# Patient Record
Sex: FEMALE | Race: WHITE | Employment: OTHER | URBAN - METROPOLITAN AREA
[De-identification: names, ages, dates, MRNs, and addresses within clinical notes are randomized per-mention and may not be internally consistent; named-entity substitution may affect disease eponyms.]

---

## 2017-06-06 ENCOUNTER — THERAPY VISIT (OUTPATIENT)
Dept: PHYSICAL THERAPY | Facility: CLINIC | Age: 62
End: 2017-06-06
Payer: COMMERCIAL

## 2017-06-06 DIAGNOSIS — R26.9 GAIT ABNORMALITY: ICD-10-CM

## 2017-06-06 DIAGNOSIS — Z98.890 S/P FOOT SURGERY, RIGHT: ICD-10-CM

## 2017-06-06 DIAGNOSIS — M79.671 RIGHT FOOT PAIN: Primary | ICD-10-CM

## 2017-06-06 PROCEDURE — 97110 THERAPEUTIC EXERCISES: CPT | Mod: GP | Performed by: PHYSICAL THERAPIST

## 2017-06-06 PROCEDURE — 97140 MANUAL THERAPY 1/> REGIONS: CPT | Mod: GP | Performed by: PHYSICAL THERAPIST

## 2017-06-06 PROCEDURE — 97161 PT EVAL LOW COMPLEX 20 MIN: CPT | Mod: GP | Performed by: PHYSICAL THERAPIST

## 2017-06-06 NOTE — MR AVS SNAPSHOT
After Visit Summary   6/6/2017    Nydia Phipps    MRN: [id number]           Patient Information     Date Of Birth          1955        Visit Information        Provider Department      6/6/2017 2:40 PM Lokesh Walter PT [facility] [facility]        Today's Diagnoses     Right foot pain    -  1    S/P foot surgery, right        Gait abnormality           Follow-ups after your visit        Your next 10 appointments already scheduled     Jun 13, 2017  8:10 AM CDT   WILLIAM Extremity with Lokesh Walter PT   [facility] [place] [facility] ([facility])    [address]  [place] [address]   [phone]            Jun 20, 2017  5:20 PM CDT   WILLIAM Extremity with Lokesh Walter PT   [facility] [place] [facility] ([facility])    [address]  [place] [address]   [phone]            Jun 27, 2017  4:40 PM CDT   WILLIAM Extremity with Lokesh Walter PT   [facility] [place] [facility] ([facility])    [address]  [address]  [place] [address]   [phone]            Jul 11, 2017  4:40 PM CDT   WILLIAM Extremity with Lokesh Walter PT   [facility] [place] [facility] ([facility])    [address]  [place] [address]   [phone]            Jul 18, 2017  4:40 PM CDT   WILLIAM Extremity with Lokesh Walter PT   [facility] [place] [facility] ([facility])    [address]  [place] [address]   [phone]              Who to contact     If you have questions or need follow up information about today's clinic visit or your schedule please contact [facility] directly at [phone].  Normal or non-critical lab and imaging results will be communicated to you by MyChart, letter or phone within 4 business days after the clinic has received the results. If you do not hear from us  "within 7 days, please contact the clinic through [employer] or phone. If you have a critical or abnormal lab result, we will notify you by phone as soon as possible.  Submit refill requests through [employer] or call your pharmacy and they will forward the refill request to us. Please allow 3 business days for your refill to be completed.          Additional Information About Your Visit        [employer] Information     ConnectYard lets you send messages to your doctor, view your test results, renew your prescriptions, schedule appointments and more. To sign up, go to [email or web address] . Click on \"Log in\" on the left side of the screen, which will take you to the Welcome page. Then click on \"Sign up Now\" on the right side of the page.     You will be asked to enter the access code listed below, as well as some personal information. Please follow the directions to create your username and password.     Your access code is: [email or web address]  Expires: 2017  3:55 PM     Your access code will  in 90 days. If you need help or a new code, please call your [place] clinic or [phone].        Care EveryWhere ID     This is your Care EveryWhere ID. This could be used by other organizations to access your [facility] medical records  [id number]         Blood Pressure from Last 3 Encounters:   12/02/15 137/87   10/10/15 129/77    Weight from Last 3 Encounters:   12/02/15 77.1 kg (170 lb)   10/10/15 75.6 kg (166 lb 9.6 oz)              We Performed the Following     HC PT EVAL, LOW COMPLEXITY     WILLIAM INITIAL EVAL REPORT     MANUAL THER TECH,1+REGIONS,EA 15 MIN     THERAPEUTIC EXERCISES        Primary Care Provider    None Specified       No primary provider on file.        Thank you!     Thank you for choosing [facility]  for your care. Our goal is always to provide you with excellent care. Hearing back from our patients is one way we can continue to improve our services. Please take a few " minutes to complete the written survey that you may receive in the mail after your visit with us. Thank you!             Your Updated Medication List - Protect others around you: Learn how to safely use, store and throw away your medicines at www.disposemymeds.org.          This list is accurate as of: 6/6/17  3:55 PM.  Always use your most recent med list.                   Brand Name Dispense Instructions for use    AMBIEN PO      Take 10 mg by mouth At Bedtime       AMITRIPTYLINE HCL PO      Take 10 mg by mouth At Bedtime       ARMOUR THYROID PO      Take 0.5 g by mouth       ESTRADIOL PO      Take 1 mg by mouth       LISINOPRIL PO      Take 10 mg by mouth daily       magnesium 250 MG tablet      Take 1 tablet by mouth daily       OMEGA-3 FISH OIL PO      Take 2,000 mg by mouth daily       PROBIOTIC DAILY Caps          PROGESTERONE MICRONIZED PO      Take 200 mg by mouth daily       VITAMIN D3 PO      Take 5,000 Units by mouth daily

## 2017-06-06 NOTE — PROGRESS NOTES
[facility] Initial Evaluation    Subjective:    Patient is a 61 year old female presenting with rehab right ankle/foot hpi. The history is provided by the patient. No  was used.   Nydia Phipps is a 61 year old female with a right foot and right ankle condition.  Condition occurred with:  Repetition/overuse.  Condition occurred: for unknown reasons.  This is a new condition  Patient presents to PT today with right foot pain s/p right TMT fusion on 2/1/17. She reports she has been going to physical therapy 1x/week since surgery but was referred for further PT to work on joint mobility. Most recently was referred to PT on 6/6/17.    Patient reports pain:  Anterior and other (1st MTP).  Radiates to:  No radiation.  Pain is described as sharp and is intermittent and reported as 3/10 and 8/10.  Associated symptoms:  Loss of motion/stiffness. Pain is worse during the day.  Symptoms are exacerbated by descending stairs and walking and relieved by rest.  Since onset symptoms are gradually improving.    Previous treatment includes physical therapy.  There was moderate improvement following previous treatment.  General health as reported by patient is good.  Pertinent medical history includes:  High blood pressure.  Medical allergies: no.  Other surgeries include:  None reported.  Current medications:  Thyroid medication, hormone replacement therapy and high blood pressure medication.  Current occupation is   .  Patient is working in normal job without restrictions.  Primary job tasks include:  Prolonged sitting and other (computer work).    Barriers include:  None as reported by patient.    Red flags:  None as reported by patient.                        Objective:      Gait:    Gait Type:  Antalgic   Assistive Devices:  None  Deviations:  Ankle:  Push off decr RGeneral Deviations:  Stance time decr    Flexibility/Screens:       Lower Extremity:  Decreased  left lower extremity flexibility:Gastroc and Soleus    Decreased right lower extremity flexibility:  Gastroc and Soleus          Ankle/Foot Evaluation  ROM:    AROM:    Dorsiflexion:  Left:   5  Right:   2  Plantarflexion:  Left:  52    Right:  55  Inversion:  Left:  40     Right:  35  Eversion:  5     Right:  2        Strength:    Dorsiflexion:  Left: 5/5      Pain:-   Right: 5-/5    Pain:-  Plantarflexion: Left: 5/5    Pain:-   Right: 4-/5   Pain:+  Inversion:Left: 4+/5   Pain:-     Right: 5-/5   Pain:-  Eversion:Left: 5/5   Pain:-  Right: 5-/5   Pain:-                      PALPATION: Palpation of ankle: Tenderness to palpation at right anterior ankle, 1st metatarsal and 1st MTP.      MOBILITY TESTING:       Talocrural Right: hypomobile    Midtarsal Right: hypomobile  First Ray Right: hypomobile  FUNCTIONAL TESTS:           Proprioception:  Stork Balance Test: Left: 20 seconds  Right: 5-10 seconds                                                     General     ROS    Assessment/Plan:      Patient is a 61 year old female with right side ankle complaints.    Patient has the following significant findings with corresponding treatment plan.                Diagnosis 1:  Right foot pain s/p TMT fusion    Pain -  self management, education and home program  Decreased ROM/flexibility - manual therapy, therapeutic exercise and home program  Decreased joint mobility - manual therapy, therapeutic exercise and home program  Decreased strength - therapeutic exercise, therapeutic activities and home program  Impaired balance - neuro re-education, therapeutic activities and home program  Impaired gait - gait training  Impaired muscle performance - neuro re-education and home program  Decreased function - therapeutic activities and home program    Therapy Evaluation Codes:   1) History comprised of:   Personal factors that impact the plan of care:      Time since onset of symptoms.    Comorbidity factors that impact the plan of  care are:      None.     Medications impacting care: None.  2) Examination of Body Systems comprised of:   Body structures and functions that impact the plan of care:      Ankle.   Activity limitations that impact the plan of care are:      Stairs, Standing and Walking.  3) Clinical presentation characteristics are:   Stable/Uncomplicated.  4) Decision-Making    Low complexity using standardized patient assessment instrument and/or measureable assessment of functional outcome.  Cumulative Therapy Evaluation is: Low complexity.    Previous and current functional limitations:  (See Goal Flow Sheet for this information)    Short term and Long term goals: (See Goal Flow Sheet for this information)     Communication ability:  Patient appears to be able to clearly communicate and understand verbal and written communication and follow directions correctly.  Treatment Explanation - The following has been discussed with the patient:   RX ordered/plan of care  Anticipated outcomes  Possible risks and side effects  This patient would benefit from PT intervention to resume normal activities.   Rehab potential is good.    Frequency:  1 X week, once daily  Duration:  for 6 weeks  Discharge Plan:  Achieve all LTG.  Independent in home treatment program.  Reach maximal therapeutic benefit.    Please refer to the daily flowsheet for treatment today, total treatment time and time spent performing 1:1 timed codes.

## 2017-06-20 ENCOUNTER — THERAPY VISIT (OUTPATIENT)
Dept: PHYSICAL THERAPY | Facility: CLINIC | Age: 62
End: 2017-06-20
Payer: COMMERCIAL

## 2017-06-20 DIAGNOSIS — Z98.890 S/P FOOT SURGERY, RIGHT: ICD-10-CM

## 2017-06-20 DIAGNOSIS — R26.9 GAIT ABNORMALITY: ICD-10-CM

## 2017-06-20 DIAGNOSIS — M79.671 RIGHT FOOT PAIN: ICD-10-CM

## 2017-06-20 PROCEDURE — 97140 MANUAL THERAPY 1/> REGIONS: CPT | Mod: GP | Performed by: PHYSICAL THERAPIST

## 2017-06-20 PROCEDURE — 97110 THERAPEUTIC EXERCISES: CPT | Mod: GP | Performed by: PHYSICAL THERAPIST

## 2017-06-27 ENCOUNTER — THERAPY VISIT (OUTPATIENT)
Dept: PHYSICAL THERAPY | Facility: CLINIC | Age: 62
End: 2017-06-27
Payer: COMMERCIAL

## 2017-06-27 DIAGNOSIS — M79.671 RIGHT FOOT PAIN: ICD-10-CM

## 2017-06-27 DIAGNOSIS — R26.9 GAIT ABNORMALITY: ICD-10-CM

## 2017-06-27 DIAGNOSIS — M79.672 LEFT FOOT PAIN: Primary | ICD-10-CM

## 2017-06-27 DIAGNOSIS — Z98.890 S/P FOOT SURGERY, RIGHT: ICD-10-CM

## 2017-06-27 PROCEDURE — 97161 PT EVAL LOW COMPLEX 20 MIN: CPT | Mod: GP | Performed by: PHYSICAL THERAPIST

## 2017-06-27 PROCEDURE — 97140 MANUAL THERAPY 1/> REGIONS: CPT | Mod: GP | Performed by: PHYSICAL THERAPIST

## 2017-06-27 PROCEDURE — 97110 THERAPEUTIC EXERCISES: CPT | Mod: GP | Performed by: PHYSICAL THERAPIST

## 2017-06-27 NOTE — MR AVS SNAPSHOT
"              After Visit Summary   6/27/2017    Nydia Phipps    MRN: [id number]           Patient Information     Date Of Birth          1955        Visit Information        Provider Department      6/27/2017 4:40 PM Lokesh Walter PT [facility] [place] [facility]        Today's Diagnoses     Left foot pain    -  1    Right foot pain        S/P foot surgery, right        Gait abnormality           Follow-ups after your visit        Your next 10 appointments already scheduled     Jul 11, 2017  4:40 PM CDT   WILLIAM Extremity with Lokesh Walter PT   [facility] [place] [facility] ([facility])    [address]  [place] [address]   [phone]            Jul 18, 2017  4:40 PM CDT   WILLIAM Extremity with Lokesh Walter PT   [facility] [place] [facility] ([facility])    [address]  [place] [address] [phone]              Who to contact     If you have questions or need follow up information about today's clinic visit or your schedule please contact [facility] directly at [phone].  Normal or non-critical lab and imaging results will be communicated to you by [employer], letter or phone within 4 business days after the clinic has received the results. If you do not hear from us within 7 days, please contact the clinic through [employer] or phone. If you have a critical or abnormal lab result, we will notify you by phone as soon as possible.  Submit refill requests through [employer] or call your pharmacy and they will forward the refill request to us. Please allow 3 business days for your refill to be completed.          Additional Information About Your Visit        MyChart Information     [employer] lets you send messages to your doctor, view your test results, renew your prescriptions, schedule appointments and more. To sign up, go to [email or web address] . Click on \"Log in\" on the left " "side of the screen, which will take you to the Welcome page. Then click on \"Sign up Now\" on the right side of the page.     You will be asked to enter the access code listed below, as well as some personal information. Please follow the directions to create your username and password.     Your access code is: [email or web address]  Expires: 2017  3:55 PM     Your access code will  in 90 days. If you need help or a new code, please call your [place] clinic or [phone].        Care EveryWhere ID     This is your Care EveryWhere ID. This could be used by other organizations to access your [facility] medical records  [id number]         Blood Pressure from Last 3 Encounters:   12/02/15 137/87   10/10/15 129/77    Weight from Last 3 Encounters:   12/02/15 77.1 kg (170 lb)   10/10/15 75.6 kg (166 lb 9.6 oz)              We Performed the Following     HC PT EVAL, LOW COMPLEXITY     WILLIAM INITIAL EVAL REPORT     MANUAL THER TECH,1+REGIONS,EA 15 MIN     THERAPEUTIC EXERCISES        Primary Care Provider    None Specified       No primary provider on file.        Equal Access to Services     [facility]: Hadii anabell Hines, waaxda neeta, qaybta kaalmada mariella, silva valdivia . So [facility] [phone].    ATENCIÓN: Si habla español, tiene a garcía disposición servicios gratuitos de asistencia lingüística. Llame al [phone].    We comply with applicable federal civil rights laws and [place] laws. We do not discriminate on the basis of race, color, national origin, age, disability sex, sexual orientation or gender identity.            Thank you!     Thank you for choosing [facility]  for your care. Our goal is always to provide you with excellent care. Hearing back from our patients is one way we can continue to improve our services. Please take a few minutes to complete the written survey that you may receive in the mail after your visit with us. " Thank you!             Your Updated Medication List - Protect others around you: Learn how to safely use, store and throw away your medicines at www.disposemymeds.org.          This list is accurate as of: 6/27/17  6:04 PM.  Always use your most recent med list.                   Brand Name Dispense Instructions for use Diagnosis    AMBIEN PO      Take 10 mg by mouth At Bedtime        AMITRIPTYLINE HCL PO      Take 10 mg by mouth At Bedtime        ARMOUR THYROID PO      Take 0.5 g by mouth        ESTRADIOL PO      Take 1 mg by mouth        LISINOPRIL PO      Take 10 mg by mouth daily        magnesium 250 MG tablet      Take 1 tablet by mouth daily        OMEGA-3 FISH OIL PO      Take 2,000 mg by mouth daily        PROBIOTIC DAILY Caps           PROGESTERONE MICRONIZED PO      Take 200 mg by mouth daily        VITAMIN D3 PO      Take 5,000 Units by mouth daily

## 2017-06-27 NOTE — PROGRESS NOTES
[facility] Initial Evaluation    Subjective:    Patient is a 61 year old female presenting with rehab left ankle/foot hpi. The history is provided by the patient. No  was used.   Nydia Phipps is a 61 year old female with a left foot condition.  Condition occurred with:  Repetition/overuse.  Condition occurred: for unknown reasons.  This is a new condition  Patient reports onset of left foot pain over the past few months after having surgery on her right foot. She reports she has noticed the pain more as she began walking more after the right foot surgery. MD orders dated 6/6/17  .    Patient reports pain:  Anterior.  Radiates to:  No radiation.  Pain is described as aching and is intermittent and reported as 4/10.  Associated symptoms:  Loss of motion/stiffness. Pain is worse during the day.  Symptoms are exacerbated by standing and walking and relieved by rest.  Since onset symptoms are unchanged.        General health as reported by patient is good.  Pertinent medical history includes:  High blood pressure.  Medical allergies: no.  Other surgeries include:  Orthopedic surgery (right foot).  Current medications:  Thyroid medication, hormone replacement therapy and high blood pressure medication.  Current occupation is   .  Patient is working in normal job without restrictions.  Primary job tasks include:  Prolonged sitting and other (computer work).    Barriers include:  None as reported by patient.    Red flags:  None as reported by patient.                        Objective:      Gait:    Gait Type:  Antalgic   Assistive Devices:  None  Deviations:  Ankle:  Push off decr R    Flexibility/Screens:       Lower Extremity:  Decreased left lower extremity flexibility:Gastroc and Soleus            Ankle/Foot Evaluation  ROM:    AROM:    Dorsiflexion: Left:   5  Right:    Plantarflexion: Left:  55    Right:   Inversion: Left:  40     Right:   Eversion:  6     Right:         Strength:    Dorsiflexion:  Left: 5/5      Pain:-     Plantarflexion: Left: 5/5    Pain:-     Inversion:Left: 4+/5   Pain:-       Eversion:Left: 5/5   Pain:-                        PALPATION: normal      MOBILITY TESTING:       Talocrural Left: hypomobile        Midtarsal Left: normal      First Ray Left: normal                                                        General     ROS    Assessment/Plan:      Patient is a 61 year old female with left side ankle complaints.    Patient has the following significant findings with corresponding treatment plan.                Diagnosis 1:  Left foot/ankle pain    Pain -  self management, education and home program  Decreased ROM/flexibility - manual therapy, therapeutic exercise and home program  Decreased joint mobility - manual therapy, therapeutic exercise and home program  Decreased strength - therapeutic exercise, therapeutic activities and home program  Impaired muscle performance - neuro re-education and home program  Decreased function - therapeutic activities and home program    Therapy Evaluation Codes:   1) History comprised of:   Personal factors that impact the plan of care:      None.    Comorbidity factors that impact the plan of care are:      None.     Medications impacting care: None.  2) Examination of Body Systems comprised of:   Body structures and functions that impact the plan of care:      Ankle.   Activity limitations that impact the plan of care are:      Standing and Walking.  3) Clinical presentation characteristics are:   Stable/Uncomplicated.  4) Decision-Making    Low complexity using standardized patient assessment instrument and/or measureable assessment of functional outcome.  Cumulative Therapy Evaluation is: Low complexity.    Previous and current functional limitations:  (See Goal Flow Sheet for this information)    Short term and Long term goals: (See Goal Flow Sheet for this information)     Communication ability:   Patient appears to be able to clearly communicate and understand verbal and written communication and follow directions correctly.  Treatment Explanation - The following has been discussed with the patient:   RX ordered/plan of care  Anticipated outcomes  Possible risks and side effects  This patient would benefit from PT intervention to resume normal activities.   Rehab potential is good.    Frequency:  1 X week, once daily  Duration:  for 6 weeks  Discharge Plan:  Achieve all LTG.  Independent in home treatment program.  Reach maximal therapeutic benefit.    Please refer to the daily flowsheet for treatment today, total treatment time and time spent performing 1:1 timed codes.

## 2017-06-27 NOTE — LETTER
[facility] [facility]  [address]  [address]  [place] [address]  [phone]    2017    Re: Nydia Phipps   :   1955  MRN:  [id number]   REFERRING PHYSICIAN:   Ariel Scott    [place] [facility]    Date of Initial Evaluation:  2017  Visits:  Rxs Used: 3  Reason for Referral:     Right foot pain  S/P foot surgery, right  Gait abnormality  Left foot pain    [facility] Initial Evaluation    Subjective:  Patient is a 61 year old female presenting with rehab left ankle/foot hpi. The history is provided by the patient. No  was used. Nydia Phipps is a 61 year old female with a left foot condition. Condition occurred with:  Repetition/overuse.  Condition occurred: for unknown reasons.  This is a new condition  Patient reports onset of left foot pain over the past few months after having surgery on her right foot. She reports she has noticed the pain more as she began walking more after the right foot surgery. MD orders dated 17. Patient reports pain:  Anterior.  Radiates to:  No radiation.  Pain is described as aching and is intermittent and reported as 4/10.  Associated symptoms:  Loss of motion/stiffness. Pain is worse during the day. Symptoms are exacerbated by standing and walking and relieved by rest.  Since onset symptoms are unchanged. General health as reported by patient is good.  Pertinent medical history includes:  High blood pressure.  Medical allergies: no.  Other surgeries include:  Orthopedic surgery (right foot).  Current medications:  Thyroid medication, hormone replacement therapy and high blood pressure medication.  Current occupation is . Patient is working in normal job without restrictions.  Primary job tasks include:  Prolonged sitting and other (computer work). Barriers include:  None as reported by patient. Red flags:  None as  reported by patient.            Objective:  Gait:    Gait Type:  Antalgic   Assistive Devices:  None  Deviations:  Ankle:  Push off decr R  Flexibility/Screens:   Lower Extremity:  Decreased left lower extremity flexibility:Gastroc and Soleus  Ankle/Foot Evaluation  Re: Nydia Phipps   :   1955  Pg 2    ROM:    AROM:    Dorsiflexion: Left:   5  Right:    Plantarflexion: Left:  55    Right:   Inversion: Left:  40     Right:   Eversion: 6     Right:   Strength:    Dorsiflexion:  Left: 5/5      Pain:-     Plantarflexion: Left: 5/5    Pain:-     Inversion:Left: 4+/5   Pain:-       Eversion:Left: 5/5   Pain:-    PALPATION: normal  MOBILITY TESTING:   Talocrural Left: hypomobile      Midtarsal Left: normal      First Ray Left: normal           Assessment/Plan:    Patient is a 61 year old female with left side ankle complaints.    Patient has the following significant findings with corresponding treatment plan.                Diagnosis 1:  Left foot/ankle pain    Pain -  self management, education and home program  Decreased ROM/flexibility - manual therapy, therapeutic exercise and home program  Decreased joint mobility - manual therapy, therapeutic exercise and home program  Decreased strength - therapeutic exercise, therapeutic activities and home program  Impaired muscle performance - neuro re-education and home program  Decreased function - therapeutic activities and home program    Therapy Evaluation Codes:   1) History comprised of:   Personal factors that impact the plan of care:      None.    Comorbidity factors that impact the plan of care are:      None.     Medications impacting care: None.  2) Examination of Body Systems comprised of:   Body structures and functions that impact the plan of care:      Ankle.   Activity limitations that impact the plan of care are:      Standing and Walking.  3) Clinical presentation characteristics are:   Stable/Uncomplicated.  4) Decision-Making    Low complexity  using standardized patient assessment instrument and/or measureable assessment of functional outcome.  Cumulative Therapy Evaluation is: Low complexity.  Re: Nydia Phipps   :   1955  Pg 3    Previous and current functional limitations:  (See Goal Flow Sheet for this information)    Short term and Long term goals: (See Goal Flow Sheet for this information)     Communication ability:  Patient appears to be able to clearly communicate and understand verbal and written communication and follow directions correctly.  Treatment Explanation - The following has been discussed with the patient:   RX ordered/plan of care  Anticipated outcomes  Possible risks and side effects  This patient would benefit from PT intervention to resume normal activities.   Rehab potential is good.    Frequency:  1 X week, once daily  Duration:  for 6 weeks  Discharge Plan:  Achieve all LTG.  Independent in home treatment program.  Reach maximal therapeutic benefit.    Thank you for your referral.    INQUIRIES  Therapist: Lokesh Walter DPT  [facility]  [address]  [place] [address]  Phone: [phone]  Fax: [phone]

## 2017-07-11 ENCOUNTER — THERAPY VISIT (OUTPATIENT)
Dept: PHYSICAL THERAPY | Facility: CLINIC | Age: 62
End: 2017-07-11
Payer: COMMERCIAL

## 2017-07-11 DIAGNOSIS — M79.672 LEFT FOOT PAIN: ICD-10-CM

## 2017-07-11 DIAGNOSIS — M79.671 RIGHT FOOT PAIN: ICD-10-CM

## 2017-07-11 DIAGNOSIS — R26.9 GAIT ABNORMALITY: ICD-10-CM

## 2017-07-11 DIAGNOSIS — Z98.890 S/P FOOT SURGERY, RIGHT: ICD-10-CM

## 2017-07-11 PROCEDURE — 97110 THERAPEUTIC EXERCISES: CPT | Mod: GP | Performed by: PHYSICAL THERAPIST

## 2017-07-11 PROCEDURE — 97112 NEUROMUSCULAR REEDUCATION: CPT | Mod: GP | Performed by: PHYSICAL THERAPIST

## 2017-07-11 PROCEDURE — 97140 MANUAL THERAPY 1/> REGIONS: CPT | Mod: GP | Performed by: PHYSICAL THERAPIST

## 2017-07-14 ENCOUNTER — THERAPY VISIT (OUTPATIENT)
Dept: PHYSICAL THERAPY | Facility: CLINIC | Age: 62
End: 2017-07-14
Payer: COMMERCIAL

## 2017-07-14 DIAGNOSIS — M79.672 LEFT FOOT PAIN: ICD-10-CM

## 2017-07-14 DIAGNOSIS — M79.671 RIGHT FOOT PAIN: ICD-10-CM

## 2017-07-14 DIAGNOSIS — Z98.890 S/P FOOT SURGERY, RIGHT: ICD-10-CM

## 2017-07-14 DIAGNOSIS — R26.9 GAIT ABNORMALITY: ICD-10-CM

## 2017-07-14 PROCEDURE — 97110 THERAPEUTIC EXERCISES: CPT | Mod: GP | Performed by: PHYSICAL THERAPIST

## 2017-07-14 PROCEDURE — 97112 NEUROMUSCULAR REEDUCATION: CPT | Mod: GP | Performed by: PHYSICAL THERAPIST

## 2017-07-14 PROCEDURE — 97140 MANUAL THERAPY 1/> REGIONS: CPT | Mod: GP | Performed by: PHYSICAL THERAPIST

## 2017-07-18 ENCOUNTER — THERAPY VISIT (OUTPATIENT)
Dept: PHYSICAL THERAPY | Facility: CLINIC | Age: 62
End: 2017-07-18
Payer: COMMERCIAL

## 2017-07-18 DIAGNOSIS — R26.9 GAIT ABNORMALITY: ICD-10-CM

## 2017-07-18 DIAGNOSIS — M79.671 RIGHT FOOT PAIN: ICD-10-CM

## 2017-07-18 DIAGNOSIS — M79.672 LEFT FOOT PAIN: ICD-10-CM

## 2017-07-18 DIAGNOSIS — Z98.890 S/P FOOT SURGERY, RIGHT: ICD-10-CM

## 2017-07-18 PROCEDURE — 97112 NEUROMUSCULAR REEDUCATION: CPT | Mod: GP | Performed by: PHYSICAL THERAPIST

## 2017-07-18 PROCEDURE — 97140 MANUAL THERAPY 1/> REGIONS: CPT | Mod: GP | Performed by: PHYSICAL THERAPIST

## 2017-07-18 PROCEDURE — 97110 THERAPEUTIC EXERCISES: CPT | Mod: GP | Performed by: PHYSICAL THERAPIST

## 2017-07-20 ENCOUNTER — THERAPY VISIT (OUTPATIENT)
Dept: PHYSICAL THERAPY | Facility: CLINIC | Age: 62
End: 2017-07-20
Payer: COMMERCIAL

## 2017-07-20 DIAGNOSIS — M79.671 RIGHT FOOT PAIN: ICD-10-CM

## 2017-07-20 DIAGNOSIS — R26.9 GAIT ABNORMALITY: ICD-10-CM

## 2017-07-20 DIAGNOSIS — Z98.890 S/P FOOT SURGERY, RIGHT: ICD-10-CM

## 2017-07-20 DIAGNOSIS — M79.672 LEFT FOOT PAIN: ICD-10-CM

## 2017-07-20 PROCEDURE — 97110 THERAPEUTIC EXERCISES: CPT | Mod: GP | Performed by: PHYSICAL THERAPIST

## 2017-07-20 PROCEDURE — 97140 MANUAL THERAPY 1/> REGIONS: CPT | Mod: GP | Performed by: PHYSICAL THERAPIST

## 2017-07-25 ENCOUNTER — THERAPY VISIT (OUTPATIENT)
Dept: PHYSICAL THERAPY | Facility: CLINIC | Age: 62
End: 2017-07-25
Payer: COMMERCIAL

## 2017-07-25 DIAGNOSIS — Z98.890 S/P FOOT SURGERY, RIGHT: ICD-10-CM

## 2017-07-25 DIAGNOSIS — R26.9 GAIT ABNORMALITY: ICD-10-CM

## 2017-07-25 DIAGNOSIS — M79.671 RIGHT FOOT PAIN: ICD-10-CM

## 2017-07-25 DIAGNOSIS — M79.672 LEFT FOOT PAIN: ICD-10-CM

## 2017-07-25 PROCEDURE — 97140 MANUAL THERAPY 1/> REGIONS: CPT | Mod: GP | Performed by: PHYSICAL THERAPIST

## 2017-07-25 PROCEDURE — 97110 THERAPEUTIC EXERCISES: CPT | Mod: GP | Performed by: PHYSICAL THERAPIST

## 2017-08-25 ENCOUNTER — THERAPY VISIT (OUTPATIENT)
Dept: PHYSICAL THERAPY | Facility: CLINIC | Age: 62
End: 2017-08-25
Payer: COMMERCIAL

## 2017-08-25 DIAGNOSIS — R26.9 GAIT ABNORMALITY: ICD-10-CM

## 2017-08-25 DIAGNOSIS — M79.672 LEFT FOOT PAIN: ICD-10-CM

## 2017-08-25 DIAGNOSIS — M79.671 RIGHT FOOT PAIN: ICD-10-CM

## 2017-08-25 DIAGNOSIS — Z98.890 S/P FOOT SURGERY, RIGHT: ICD-10-CM

## 2017-08-25 PROCEDURE — 97110 THERAPEUTIC EXERCISES: CPT | Mod: GP | Performed by: PHYSICAL THERAPIST

## 2017-08-25 PROCEDURE — 97140 MANUAL THERAPY 1/> REGIONS: CPT | Mod: GP | Performed by: PHYSICAL THERAPIST

## 2017-08-25 NOTE — MR AVS SNAPSHOT
"              After Visit Summary   8/25/2017    Nydia Phipps    MRN: [id number]           Patient Information     Date Of Birth          1955        Visit Information        Provider Department      8/25/2017 4:50 PM Lokesh Walter PT [place] [facility] [place] [facility]        Today's Diagnoses     Left foot pain        Right foot pain        S/P foot surgery, right        Gait abnormality           Follow-ups after your visit        Your next 10 appointments already scheduled     Sep 08, 2017  4:50 PM CDT   [facility] Extremity with Lokesh Walter PT   [place] [facility] [place] [facility] ([facility] [place])    [address]  [address]  [place] [id number] [phone]              Who to contact     If you have questions or need follow up information about today's clinic visit or your schedule please contact [place] [facility] directly at [phone].  Normal or non-critical lab and imaging results will be communicated to you by eÃ‡ifthart, letter or phone within 4 business days after the clinic has received the results. If you do not hear from us within 7 days, please contact the clinic through eÃ‡ifthart or phone. If you have a critical or abnormal lab result, we will notify you by phone as soon as possible.  Submit refill requests through [employer] or call your pharmacy and they will forward the refill request to us. Please allow 3 business days for your refill to be completed.          Additional Information About Your Visit        MyChart Information     [employer] lets you send messages to your doctor, view your test results, renew your prescriptions, schedule appointments and more. To sign up, go to [email or web address] . Click on \"Log in\" on the left side of the screen, which will take you to the Welcome page. Then click on \"Sign up Now\" on the right side of the page.     You will be asked to enter the access code listed below, as well as some personal information. " Please follow the directions to create your username and password.     Your access code is: [email or web address]  Expires: 2017  3:55 PM     Your access code will  in 90 days. If you need help or a new code, please call your [place] clinic or [phone].        Care EveryWhere ID     This is your Care EveryWhere ID. This could be used by other organizations to access your [place] medical records  [id number]         Blood Pressure from Last 3 Encounters:   12/02/15 137/87   10/10/15 129/77    Weight from Last 3 Encounters:   12/02/15 77.1 kg (170 lb)   10/10/15 75.6 kg (166 lb 9.6 oz)              We Performed the Following     [facility] PROGRESS NOTES REPORT     MANUAL THER TECH,1+REGIONS,EA 15 MIN     THERAPEUTIC EXERCISES        Primary Care Provider    None Specified       No primary provider on file.        Equal Access to Services     [facility]: Had[facility] anabell simms hadaravindo Solinda, waaxda luqadaha, qaybta kaalmada adejackyada, silva valdivia . So [place] -[phone].    ATENCIÓN: Si habla español, tiene a garcía disposición servicios gratuitos de asistencia lingüística. Maryan al [phone].    We comply with applicable federal civil rights laws and [place] laws. We do not discriminate on the basis of race, color, national origin, age, disability sex, sexual orientation or gender identity.            Thank you!     Thank you for choosing [facility]  for your care. Our goal is always to provide you with excellent care. Hearing back from our patients is one way we can continue to improve our services. Please take a few minutes to complete the written survey that you may receive in the mail after your visit with us. Thank you!             Your Updated Medication List - Protect others around you: Learn how to safely use, store and throw away your medicines at www.disposemymeds.org.          This list is accurate as of: 17 11:59 PM.  Always use your most recent  med list.                   Brand Name Dispense Instructions for use Diagnosis    AMBIEN PO      Take 10 mg by mouth At Bedtime        AMITRIPTYLINE HCL PO      Take 10 mg by mouth At Bedtime        ARMOUR THYROID PO      Take 0.5 g by mouth        ESTRADIOL PO      Take 1 mg by mouth        LISINOPRIL PO      Take 10 mg by mouth daily        magnesium 250 MG tablet      Take 1 tablet by mouth daily        OMEGA-3 FISH OIL PO      Take 2,000 mg by mouth daily        PROBIOTIC DAILY Caps           PROGESTERONE MICRONIZED PO      Take 200 mg by mouth daily        VITAMIN D3 PO      Take 5,000 Units by mouth daily

## 2017-08-25 NOTE — LETTER
[place] [facility]  [address]  [address]  [place] [address]  [phone]    2017    Re: Nydia Phipps   :   1955  MRN:  [id number]   REFERRING PHYSICIAN:   Ariel Scott    [place] [facility]    Date of Initial Evaluation: 17  Visits:  Rxs Used: 9  Reason for Referral:     Left foot pain  Right foot pain  S/P foot surgery, right  Gait abnormality    PROGRESS  REPORT    Progress reporting period is from 17 to 17.       SUBJECTIVE  Subjective changes noted by patient: Patient returns to therapy after 1 month away. She reports that the right foot remains sore, especially with ambulation. She reports she has been doing the calf stretches 1x/day. She reports that she has been able to do double leg calf raises, but unable to do the single leg due to pain. Patient reports that her left ankle/calf continue to remain tight even with stretching Current Pain level: 4/10.     Initial Pain level: 3/10. Changes in function:  Yes (See Goal flowsheet attached for changes in current functional level) Adverse reaction to treatment or activity: None    OBJECTIVE  Changes noted in objective findings:  AROM right ankle DF 10 degrees, increased to 12 after stretching and mobilization. Left ankle DF 8 degrees, increased to 10 degrees. Patient able to ambulate without gait deviation in clinic today. Patient demonstrates decreased talocrural posterior mobility     ASSESSMENT/PLAN  Updated problem list and treatment plan: Diagnosis 1:  Right foot pain s/p TMT fusion      Pain -  self management, education, directional preference exercise and home program  Decreased ROM/flexibility - manual therapy, therapeutic exercise and home program  Decreased joint mobility - manual therapy, therapeutic exercise and home program  Decreased strength - therapeutic exercise, therapeutic activities and home program  Decreased function - therapeutic  activities and home program  Diagnosis 2:  Left ankle/foot pain     Pain -  self management, education, directional preference exercise and home program  Decreased ROM/flexibility - manual therapy, therapeutic exercise and home program  Decreased joint mobility - manual therapy, therapeutic exercise and home program  Decreased strength - therapeutic exercise, therapeutic activities and home program    Nydia Phipps   :   1955                Decreased function - therapeutic activities and home program  STG/LTGs have been met or progress has been made towards goals:  Yes (See Goal flow sheet completed today.)  Assessment of Progress: The patient's condition is improving.  Patient is meeting short term goals and is progressing towards long term goals.  Self Management Plans:  Patient has been instructed in a home treatment program.  Patient is independent in a home treatment program.  Patient  has been instructed in self management of symptoms.  Patient is independent in self management of symptoms.  I have re-evaluated this patient and find that the nature, scope, duration and intensity of the therapy is appropriate for the medical condition of the patient.  Nydia continues to require the following intervention to meet STG and LTG's:  PT    Recommendations:  This patient would benefit from continued therapy.     Frequency:  1 X week, once daily  Duration:  for 4 weeks              Thank you for your referral.    INQUIRIES  Therapist: Lokesh Walter DPT  [facility]  [address]  [place] [address]  Phone: [phone]  Fax: [phone]

## 2017-08-27 NOTE — PROGRESS NOTES
Subjective:    HPI                    Objective:    System    Physical Exam    General     ROS    Assessment/Plan:      PROGRESS  REPORT    Progress reporting period is from 6/27/17 to 8/27/17.       SUBJECTIVE  Subjective changes noted by patient: Patient returns to therapy after 1 month away. She reports that the right foot remains sore, especially with ambulation. She reports she has been doing the calf stretches 1x/day. She reports that she has been able to do double leg calf raises, but unable to do the single leg due to pain. Patient reports that her left ankle/calf continue to remain tight even with stretching    Current Pain level: 4/10.      Initial Pain level: 3/10.   Changes in function:  Yes (See Goal flowsheet attached for changes in current functional level)  Adverse reaction to treatment or activity: None    OBJECTIVE  Changes noted in objective findings:  AROM right ankle DF 10 degrees, increased to 12 after stretching and mobilization. Left ankle DF 8 degrees, increased to 10 degrees. Patient able to ambulate without gait deviation in clinic today. Patient demonstrates decreased talocrural posterior mobility     ASSESSMENT/PLAN  Updated problem list and treatment plan: Diagnosis 1:  Right foot pain s/p TMT fusion      Pain -  self management, education, directional preference exercise and home program  Decreased ROM/flexibility - manual therapy, therapeutic exercise and home program  Decreased joint mobility - manual therapy, therapeutic exercise and home program  Decreased strength - therapeutic exercise, therapeutic activities and home program  Decreased function - therapeutic activities and home program  Diagnosis 2:  Left ankle/foot pain     Pain -  self management, education, directional preference exercise and home program  Decreased ROM/flexibility - manual therapy, therapeutic exercise and home program  Decreased joint mobility - manual therapy, therapeutic exercise and home  program  Decreased strength - therapeutic exercise, therapeutic activities and home program  Decreased function - therapeutic activities and home program  STG/LTGs have been met or progress has been made towards goals:  Yes (See Goal flow sheet completed today.)  Assessment of Progress: The patient's condition is improving.  Patient is meeting short term goals and is progressing towards long term goals.  Self Management Plans:  Patient has been instructed in a home treatment program.  Patient is independent in a home treatment program.  Patient  has been instructed in self management of symptoms.  Patient is independent in self management of symptoms.  I have re-evaluated this patient and find that the nature, scope, duration and intensity of the therapy is appropriate for the medical condition of the patient.  Nydia continues to require the following intervention to meet STG and LTG's:  PT    Recommendations:  This patient would benefit from continued therapy.     Frequency:  1 X week, once daily  Duration:  for 4 weeks          Please refer to the daily flowsheet for treatment today, total treatment time and time spent performing 1:1 timed codes.

## 2017-09-29 ENCOUNTER — THERAPY VISIT (OUTPATIENT)
Dept: PHYSICAL THERAPY | Facility: CLINIC | Age: 62
End: 2017-09-29
Payer: COMMERCIAL

## 2017-09-29 DIAGNOSIS — Z47.89 AFTERCARE FOLLOWING SURGERY OF THE MUSCULOSKELETAL SYSTEM: ICD-10-CM

## 2017-09-29 DIAGNOSIS — M25.572 ACUTE LEFT ANKLE PAIN: Primary | ICD-10-CM

## 2017-09-29 DIAGNOSIS — R26.9 GAIT ABNORMALITY: ICD-10-CM

## 2017-09-29 PROBLEM — M79.671 RIGHT FOOT PAIN: Status: RESOLVED | Noted: 2017-06-06 | Resolved: 2017-09-29

## 2017-09-29 PROBLEM — M79.672 LEFT FOOT PAIN: Status: RESOLVED | Noted: 2017-06-27 | Resolved: 2017-09-29

## 2017-09-29 PROBLEM — Z98.890 S/P FOOT SURGERY, RIGHT: Status: RESOLVED | Noted: 2017-06-06 | Resolved: 2017-09-29

## 2017-09-29 PROCEDURE — 97161 PT EVAL LOW COMPLEX 20 MIN: CPT | Mod: GP | Performed by: PHYSICAL THERAPIST

## 2017-09-29 PROCEDURE — 97110 THERAPEUTIC EXERCISES: CPT | Mod: GP | Performed by: PHYSICAL THERAPIST

## 2017-09-29 NOTE — PROGRESS NOTES
[facility] [facility] Initial Evaluation    Subjective:    Patient is a 62 year old female presenting with rehab left ankle/foot hpi. The history is provided by the patient. No  was used.   Nydia Phipps is a 62 year old female with a left ankle condition.  Condition occurred with:  Insidious onset.  Condition occurred: for unknown reasons.  This is a new condition  Patient is s/p left ankle gastroc recession and ankle ligament reconstruction internal brace on 9/11/17.    Patient reports pain:  Anterior and other (dorsal aspect of the foot).  Radiates to:  No radiation.  Pain is described as aching and is constant and reported as 5/10.  Associated symptoms:  Loss of motion/stiffness, edema and loss of strength. Pain is worse in the P.M..  Symptoms are exacerbated by walking, weight bearing and standing and relieved by rest.  Since onset symptoms are gradually improving.        General health as reported by patient is good.  Pertinent medical history includes:  High blood pressure.  Medical allergies: no.  Other surgeries include:  Orthopedic surgery (right foot reconstruction, left ankle).  Current medications:  Thyroid medication, hormone replacement therapy and high blood pressure medication.  Current occupation is   .  Patient is working in normal job without restrictions.  Primary job tasks include:  Prolonged sitting and other (computer work).    Barriers include:  None as reported by patient.    Red flags:  None as reported by patient.                        Objective:      Gait:    Gait Type:  Antalgic   Weight Bearing Status:  WBAT   Assistive Devices:  Brace  Deviations:  Ankle:  Push off decr L and push off decr RGeneral Deviations:  Stance time decr          Ankle/Foot Evaluation  ROM:    AROM:    Dorsiflexion: Left:   0  Right:    Plantarflexion: Left:  35    Right:   Inversion: Left:  15     Right:   Eversion: 5     Right:         Strength wnl ankle: Strength  testing deferred on left ankle.      PALPATION: normal    EDEMA: normal                                                              General     ROS    Assessment/Plan:      Patient is a 62 year old female with left side ankle complaints.    Patient has the following significant findings with corresponding treatment plan.                Diagnosis 1:  S/p left ankle    Pain -  self management, education and home program  Decreased ROM/flexibility - manual therapy, therapeutic exercise and home program  Decreased strength - therapeutic exercise, therapeutic activities and home program  Impaired gait - gait training  Impaired muscle performance - neuro re-education and home program  Decreased function - therapeutic activities and home program    Therapy Evaluation Codes:   1) History comprised of:   Personal factors that impact the plan of care:      None.    Comorbidity factors that impact the plan of care are:      High blood pressure.     Medications impacting care: High blood pressure.  2) Examination of Body Systems comprised of:   Body structures and functions that impact the plan of care:      Ankle.   Activity limitations that impact the plan of care are:      Standing and Walking.  3) Clinical presentation characteristics are:   Stable/Uncomplicated.  4) Decision-Making    Low complexity using standardized patient assessment instrument and/or measureable assessment of functional outcome.  Cumulative Therapy Evaluation is: Low complexity.    Previous and current functional limitations:  (See Goal Flow Sheet for this information)    Short term and Long term goals: (See Goal Flow Sheet for this information)     Communication ability:  Patient appears to be able to clearly communicate and understand verbal and written communication and follow directions correctly.  Treatment Explanation - The following has been discussed with the patient:   RX ordered/plan of care  Anticipated outcomes  Possible risks and side  effects  This patient would benefit from PT intervention to resume normal activities.   Rehab potential is good.    Frequency:  2 X week, once daily  Duration:  for 4 weeks tapering to 1 X a week over 4 weeks  Discharge Plan:  Achieve all LTG.  Independent in home treatment program.  Reach maximal therapeutic benefit.    Please refer to the daily flowsheet for treatment today, total treatment time and time spent performing 1:1 timed codes.

## 2017-09-29 NOTE — LETTER
[facility]  [address]  [address]  [place] [address]  [phone]    2017    Re: Nydia Phipps   :   1955  MRN:  [id number]   REFERRING PHYSICIAN:   Ariel Scott    [facility]    Date of Initial Evaluation: 17  Visits:  Rxs Used: 1  Reason for Referral:     Acute left ankle pain  Aftercare following surgery of the musculoskeletal system  Gait abnormality    EVALUATION SUMMARY    [facility] Initial Evaluation    Subjective:     Patient is a 62 year old female presenting with rehab left ankle/foot hpi. The history is provided by the patient. No  was used. Nydia Phipps is a 62 year old female with a left ankle condition.  Condition occurred with:  Insidious onset.  Condition occurred: for unknown reasons.  This is a new condition  Patient is s/p left ankle gastroc recession and ankle ligament reconstruction internal brace on 17. Patient reports pain:  Anterior and other (dorsal aspect of the foot).  Radiates to:  No radiation.  Pain is described as aching and is constant and reported as 5/10.  Associated symptoms:  Loss of motion/stiffness, edema and loss of strength. Pain is worse in the P.M..  Symptoms are exacerbated by walking, weight bearing and standing and relieved by rest.  Since onset symptoms are gradually improving.        General health as reported by patient is good.  Pertinent medical history includes:  High blood pressure.  Medical allergies: no.  Other surgeries include:  Orthopedic surgery (right foot reconstruction, left ankle).  Current medications:  Thyroid medication, hormone replacement therapy and high blood pressure medication.  Current occupation is  Patient is working in normal job without restrictions.  Primary job tasks include:  Prolonged sitting and other (computer work). Barriers include:  None as reported by  patient. Red flags:  None as reported by patient.    Objective:      Gait:    Gait Type:  Antalgic   Weight Bearing Status:  WBAT   Assistive Devices:  Brace  Deviations:  Ankle:  Push off decr L and push off decr RGeneral Deviations:  Stance time decr  Ankle/Foot Evaluation  ROM:    Nydia Phipps   :   1955          AROM:    Dorsiflexion: Left:   0  Right:    Plantarflexion: Left:  35    Right:   Inversion: Left:  15     Right:   Eversion: 5     Right:   Strength wnl ankle: Strength testing deferred on left ankle.  PALPATION: normal  EDEMA: normal    Assessment/Plan:      Patient is a 62 year old female with left side ankle complaints.    Patient has the following significant findings with corresponding treatment plan.                Diagnosis 1:  S/p left ankle    Pain -  self management, education and home program  Decreased ROM/flexibility - manual therapy, therapeutic exercise and home program  Decreased strength - therapeutic exercise, therapeutic activities and home program  Impaired gait - gait training  Impaired muscle performance - neuro re-education and home program  Decreased function - therapeutic activities and home program  Previous and current functional limitations:  (See Goal Flow Sheet for this information)    Short term and Long term goals: (See Goal Flow Sheet for this information)   Communication ability:  Patient appears to be able to clearly communicate and understand verbal and written communication and follow directions correctly.  Treatment Explanation - The following has been discussed with the patient:   RX ordered/plan of care  Anticipated outcomes  Possible risks and side effects  This patient would benefit from PT intervention to resume normal activities.   Rehab potential is good.  Frequency:  2 X week, once daily  Duration:  for 4 weeks tapering to 1 X a week over 4 weeks  Discharge Plan:  Achieve all LTG.  Independent in home treatment program.  Reach maximal therapeutic  benefit.           Thank you for your referral.    INQUIRIES  Therapist: Lokesh Walter DPT  [facility]  [address]  [address]  [place] [address]  Phone: [phone]  Fax: [phone]

## 2017-09-29 NOTE — MR AVS SNAPSHOT
After Visit Summary   9/29/2017    Nydia Phipps    MRN: [id number]           Patient Information     Date Of Birth          1955        Visit Information        Provider Department      9/29/2017 8:20 AM Lokesh Walter PT [facility] [place] [facility]        Today's Diagnoses     Acute left ankle pain    -  1    Aftercare following surgery of the musculoskeletal system        Gait abnormality           Follow-ups after your visit        Your next 10 appointments already scheduled     Oct 06, 2017  8:20 AM CDT   [facility]ILL[facility] Extremity with Lokesh Walter PT   [facility] [place] [facility] ([facility] [place])    [address]  [place] [address]   [phone]            Oct 10, 2017  7:20 AM CDT   WILLIAM Extremity with Lokesh Walter PT   [facility] [place] [facility] ([facility] [place])    [address]  [place] [address]   [phone]            Oct 12, 2017  5:05 PM CDT   WILLIAM Extremity with Lokesh Walter PT   [facility] ([facility]  )    [address] [id number] [phone]              Who to contact     If you have questions or need follow up information about today's clinic visit or your schedule please contact [facility] directly at [phone].  Normal or non-critical lab and imaging results will be communicated to you by MyChart, letter or phone within 4 business days after the clinic has received the results. If you do not hear from us within 7 days, please contact the clinic through MyChart or phone. If you have a critical or abnormal lab result, we will notify you by phone as soon as possible.  Submit refill requests through [employer] or call your pharmacy and they will forward the refill request to us. Please allow 3 business days for your refill to be completed.          Additional Information About Your Visit        MyChart Information   "   [employer] lets you send messages to your doctor, view your test results, renew your prescriptions, schedule appointments and more. To sign up, go to [email or web address] . Click on \"Log in\" on the left side of the screen, which will take you to the Welcome page. Then click on \"Sign up Now\" on the right side of the page.     You will be asked to enter the access code listed below, as well as some personal information. Please follow the directions to create your username and password.     Your access code is: [email or web address]  Expires: 2017  3:21 PM     Your access code will  in 90 days. If you need help or a new code, please call your [facility] clinic or [phone].        Care EveryWhere ID     This is your Care EveryWhere ID. This could be used by other organizations to access your [facility] medical records  [id number]         Blood Pressure from Last 3 Encounters:   12/02/15 137/87   10/10/15 129/77    Weight from Last 3 Encounters:   12/02/15 77.1 kg (170 lb)   10/10/15 75.6 kg (166 lb 9.6 oz)              We Performed the Following     HC PT EVAL, LOW COMPLEXITY     [facility] INITIAL EVAL REPORT     THERAPEUTIC EXERCISES        Primary Care Provider    None Specified       No primary provider on file.        Equal Access to Services     TO [place] : Hadii anabell perdomoo Solinda, waaxda luqadaha, qaybta kaalmada adeegyada, silva valdivia . So [facility] [phone].    ATENCIÓN: Si habla español, tiene a garcía disposición servicios gratuitos de asistencia lingüística. Llame al [phone].    We comply with applicable federal civil rights laws and [place] laws. We do not discriminate on the basis of race, color, national origin, age, disability, sex, sexual orientation, or gender identity.            Thank you!     Thank you for choosing [facility]  for your care. Our goal is always to provide you with excellent care. Hearing back from our patients is " one way we can continue to improve our services. Please take a few minutes to complete the written survey that you may receive in the mail after your visit with us. Thank you!             Your Updated Medication List - Protect others around you: Learn how to safely use, store and throw away your medicines at www.disposemymeds.org.          This list is accurate as of: 9/29/17  3:21 PM.  Always use your most recent med list.                   Brand Name Dispense Instructions for use Diagnosis    AMBIEN PO      Take 10 mg by mouth At Bedtime        AMITRIPTYLINE HCL PO      Take 10 mg by mouth At Bedtime        ARMOUR THYROID PO      Take 0.5 g by mouth        ESTRADIOL PO      Take 1 mg by mouth        LISINOPRIL PO      Take 10 mg by mouth daily        magnesium 250 MG tablet      Take 1 tablet by mouth daily        OMEGA-3 FISH OIL PO      Take 2,000 mg by mouth daily        PROBIOTIC DAILY Caps           PROGESTERONE MICRONIZED PO      Take 200 mg by mouth daily        VITAMIN D3 PO      Take 5,000 Units by mouth daily

## 2017-10-10 ENCOUNTER — THERAPY VISIT (OUTPATIENT)
Dept: PHYSICAL THERAPY | Facility: CLINIC | Age: 62
End: 2017-10-10
Payer: COMMERCIAL

## 2017-10-10 DIAGNOSIS — Z47.89 AFTERCARE FOLLOWING SURGERY OF THE MUSCULOSKELETAL SYSTEM: ICD-10-CM

## 2017-10-10 DIAGNOSIS — R26.9 GAIT ABNORMALITY: ICD-10-CM

## 2017-10-10 DIAGNOSIS — M25.572 ACUTE LEFT ANKLE PAIN: ICD-10-CM

## 2017-10-10 PROCEDURE — 97110 THERAPEUTIC EXERCISES: CPT | Mod: GP | Performed by: PHYSICAL THERAPIST

## 2017-10-24 ENCOUNTER — THERAPY VISIT (OUTPATIENT)
Dept: PHYSICAL THERAPY | Facility: CLINIC | Age: 62
End: 2017-10-24
Payer: COMMERCIAL

## 2017-10-24 DIAGNOSIS — Z47.89 AFTERCARE FOLLOWING SURGERY OF THE MUSCULOSKELETAL SYSTEM: ICD-10-CM

## 2017-10-24 DIAGNOSIS — R26.9 GAIT ABNORMALITY: ICD-10-CM

## 2017-10-24 DIAGNOSIS — M25.572 ACUTE LEFT ANKLE PAIN: ICD-10-CM

## 2017-10-24 PROCEDURE — 97110 THERAPEUTIC EXERCISES: CPT | Mod: GP | Performed by: PHYSICAL THERAPIST

## 2017-11-10 ENCOUNTER — THERAPY VISIT (OUTPATIENT)
Dept: PHYSICAL THERAPY | Facility: CLINIC | Age: 62
End: 2017-11-10
Payer: COMMERCIAL

## 2017-11-10 DIAGNOSIS — R26.9 GAIT ABNORMALITY: ICD-10-CM

## 2017-11-10 DIAGNOSIS — M25.572 ACUTE LEFT ANKLE PAIN: ICD-10-CM

## 2017-11-10 DIAGNOSIS — Z47.89 AFTERCARE FOLLOWING SURGERY OF THE MUSCULOSKELETAL SYSTEM: ICD-10-CM

## 2017-11-10 PROCEDURE — 97110 THERAPEUTIC EXERCISES: CPT | Mod: GP | Performed by: PHYSICAL THERAPIST

## 2017-11-10 PROCEDURE — 97112 NEUROMUSCULAR REEDUCATION: CPT | Mod: GP | Performed by: PHYSICAL THERAPIST

## 2018-01-19 ENCOUNTER — THERAPY VISIT (OUTPATIENT)
Dept: PHYSICAL THERAPY | Facility: CLINIC | Age: 63
End: 2018-01-19
Payer: COMMERCIAL

## 2018-01-19 DIAGNOSIS — M25.571 PAIN IN JOINT INVOLVING ANKLE AND FOOT, RIGHT: Primary | ICD-10-CM

## 2018-01-19 DIAGNOSIS — R26.9 GAIT ABNORMALITY: ICD-10-CM

## 2018-01-19 DIAGNOSIS — M25.572 ACUTE LEFT ANKLE PAIN: ICD-10-CM

## 2018-01-19 DIAGNOSIS — Z47.89 AFTERCARE FOLLOWING SURGERY OF THE MUSCULOSKELETAL SYSTEM: ICD-10-CM

## 2018-01-19 PROCEDURE — 97161 PT EVAL LOW COMPLEX 20 MIN: CPT | Mod: GP | Performed by: PHYSICAL THERAPIST

## 2018-01-19 PROCEDURE — 97110 THERAPEUTIC EXERCISES: CPT | Mod: GP | Performed by: PHYSICAL THERAPIST

## 2018-01-19 NOTE — PROGRESS NOTES
[facility] Initial Evaluation  Subjective:  Patient is a 62 year old female presenting with rehab right ankle/foot hpi. The history is provided by the patient. No  was used.   Nydia Phipps is a 62 year old female with a right foot condition.  Condition occurred with:  Insidious onset.  Condition occurred: for unknown reasons.  This is a new condition  Patient is s/p right ankle ligament reconstruction on 1/4/18. She reports hardware removal from previous surgery as well. .    Patient reports pain:  Lateral.  Radiates to:  No radiation.  Pain is described as aching and is constant and reported as 3/10 (up to 7/10 with activity).  Associated symptoms:  Loss of motion/stiffness and loss of strength. Pain is worse in the P.M..  Symptoms are exacerbated by weight bearing and walking and relieved by rest.  Since onset symptoms are gradually improving.        General health as reported by patient is good.  Pertinent medical history includes:  High blood pressure and depression.  Medical allergies: no.  Other surgeries include:  Orthopedic surgery (right foot, left foot).  Current medications:  Hormone replacement therapy, thyroid medication, high blood pressure medication and anti-depressants.  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  Primary job tasks include:  Prolonged sitting and other (computer work).    Barriers include:  None as reported by patient.    Red flags:  None as reported by patient.                        Objective:    Gait:    Gait Type:  Antalgic   Assistive Devices:  Brace  Deviations:  Ankle:  Push off decr RGeneral Deviations:  Stance time decr    Flexibility/Screens:       Lower Extremity:  Decreased left lower extremity flexibility:Gastroc    Decreased right lower extremity flexibility:  Gastroc          Ankle/Foot Evaluation  ROM:    AROM:    Dorsiflexion:  Left:   8  Right:   5  Plantarflexion:  Left:  50     Right:  35  Inversion:  Left:  35     Right:  28  Eversion:  5     Right:  0        Strength:    Dorsiflexion:  Left: 5/5      Pain:-     Plantarflexion: Left: 4+/5    Pain:-     Inversion:Left: 5-/5   Pain:-       Eversion:Left: 4+/5   Pain:-                Strength wnl ankle: Right ankle strength testing deferred.      PALPATION: normal                                                          General     ROS    Assessment/Plan:    Patient is a 62 year old female with right side ankle complaints.    Patient has the following significant findings with corresponding treatment plan.                Diagnosis 1:  S/p right ankle ligament reconstruction    Pain -  self management, education, directional preference exercise and home program  Decreased ROM/flexibility - manual therapy, therapeutic exercise and home program  Decreased strength - therapeutic exercise, therapeutic activities and home program  Impaired balance - neuro re-education, therapeutic activities and home program  Impaired gait - gait training  Impaired muscle performance - neuro re-education and home program  Decreased function - therapeutic activities and home program    Therapy Evaluation Codes:   1) History comprised of:   Personal factors that impact the plan of care:      None.    Comorbidity factors that impact the plan of care are:      Depression and High blood pressure.     Medications impacting care: None.  2) Examination of Body Systems comprised of:   Body structures and functions that impact the plan of care:      Ankle.   Activity limitations that impact the plan of care are:      Standing and Walking.  3) Clinical presentation characteristics are:   Stable/Uncomplicated.  4) Decision-Making    Low complexity using standardized patient assessment instrument and/or measureable assessment of functional outcome.  Cumulative Therapy Evaluation is: Low complexity.    Previous and current functional limitations:  (See Goal Flow Sheet for this  information)    Short term and Long term goals: (See Goal Flow Sheet for this information)     Communication ability:  Patient appears to be able to clearly communicate and understand verbal and written communication and follow directions correctly.  Treatment Explanation - The following has been discussed with the patient:   RX ordered/plan of care  Anticipated outcomes  Possible risks and side effects  This patient would benefit from PT intervention to resume normal activities.   Rehab potential is good.    Frequency:  2 X week, once daily  Duration:  for 6 weeks  Discharge Plan:  Achieve all LTG.  Independent in home treatment program.  Reach maximal therapeutic benefit.    Please refer to the daily flowsheet for treatment today, total treatment time and time spent performing 1:1 timed codes.

## 2018-01-19 NOTE — MR AVS SNAPSHOT
After Visit Summary   1/19/2018    Nydia Phipps    MRN: [id number]           Patient Information     Date Of Birth          1955        Visit Information        Provider Department      1/19/2018 8:20 AM Lokesh Walter PT [facility] [place] [facility]        Today's Diagnoses     Pain in joint involving ankle and foot, right    -  1    Acute left ankle pain        Aftercare following surgery of the musculoskeletal system        Gait abnormality           Follow-ups after your visit        Your next 10 appointments already scheduled     Jan 23, 2018  8:00 AM CST   [facility] Extremity with Lokesh Walter PT   [facility] [place] [facility] ([facility] [place])    [address]  [address]  [place] [address]   [phone]            Jan 26, 2018  8:20 AM CST   [facility]ILL[facility] Extremity with Lokesh Walter PT   [facility] [place] [facility] ([facility] [place])    [address]  [address]  [place] [address]   [phone]            Jan 30, 2018  8:00 AM CST   WILLIAM Extremity with Lokesh Walter PT   [facility] [place] [facility] ([facility] [place])    [address]  [address]  [place] [address]   [phone]            Feb 02, 2018  8:20 AM CST   WILLIAM Extremity with Bennie Sexton PTA   [facility] [place] [facility] ([facility] [place])    [address]  [address]  [place] [address]   [phone]            Feb 06, 2018  8:00 AM CST   WILLIAM Extremity with Lokesh Walter PT   [facility] [place] [facility] ([facility] [place])    [address]  [address]  [place] [address]   [phone]            Feb 09, 2018 10:20 AM CST   [facility]ILL[facility] Extremity with Lokesh Walter PT   [facility] [place] [facility] ([facility] [place])    [address]  [address]  [place] [address]   [phone]            Feb 13, 2018  9:20 AM CST   WILLIAM Extremity with Lokesh Walter PT   [facility] [place] [facility] ([facility] [place])    [address]  [address]  [place] [address]   [phone]            2018  9:00 AM CST   IWLLIAM Extremity with Lokesh Walter PT   [facility] [place] [facility] ([facility] [place])    [address]  [address]  [place] [address] [phone]              Who to contact     If you have questions or need follow up information about today's clinic visit or your schedule please contact [facility] directly at [phone].  Normal or non-critical lab and imaging results will be communicated to you by MyChart, letter or phone within 4 business days after the clinic has received the results. If you do not hear from us within 7 days, please contact the clinic through [employer] or phone. If you have a critical or abnormal lab result, we will notify you by phone as soon as possible.  Submit refill requests through [employer] or call your pharmacy and they will forward the refill request to us. Please allow 3 business days for your refill to be completed.          Additional Information About Your Visit        MyChart Information     [employer] lets you send messages to your doctor, view your test results, renew your prescriptions, schedule appointments and more. To sign up, go to [email or web address] . Click on \"Log in\" on the left side of the screen, which will take you to the Welcome page. Then click on \"Sign up Now\" on the right side of the page.     You will be asked to enter the access code listed below, as well as some personal information. Please follow the directions to create your username and password.     Your access code is: [email or web address]  Expires: 2018  1:25 PM     Your access code will  in 90 days. If you need help or a new code, please call your [facility] clinic or [phone].        Care EveryWhere ID     This is your Care EveryWhere ID. This could be used by other organizations " to access your [facility] medical records  [id number]         Blood Pressure from Last 3 Encounters:   12/02/15 137/87   10/10/15 129/77    Weight from Last 3 Encounters:   12/02/15 77.1 kg (170 lb)   10/10/15 75.6 kg (166 lb 9.6 oz)              We Performed the Following     HC PT EVAL, LOW COMPLEXITY     WILLIAM INITIAL EVAL REPORT     THERAPEUTIC EXERCISES        Primary Care Provider Office Phone # Fax #    Tim Zhang [phone]       [address] [address]        Equal Access to Services     [facility]: Hadii aad ku hadasho Soomaali, waaxda luqadaha, qaybta kaalmada adeegyada, silva salinas hayaan adeeg arron valdivia . So [facility] [phone].    ATENCIÓN: Si habla español, tiene a garcía disposición servicios gratuitos de asistencia lingüística. [facility] [phone].    We comply with applicable federal civil rights laws and [place] laws. We do not discriminate on the basis of race, color, national origin, age, disability, sex, sexual orientation, or gender identity.            Thank you!     Thank you for choosing [facility]  for your care. Our goal is always to provide you with excellent care. Hearing back from our patients is one way we can continue to improve our services. Please take a few minutes to complete the written survey that you may receive in the mail after your visit with us. Thank you!             Your Updated Medication List - Protect others around you: Learn how to safely use, store and throw away your medicines at www.disposemymeds.org.          This list is accurate as of: 1/19/18  1:25 PM.  Always use your most recent med list.                   Brand Name Dispense Instructions for use Diagnosis    AMBIEN PO      Take 10 mg by mouth At Bedtime        AMITRIPTYLINE HCL PO      Take 10 mg by mouth At Bedtime        ARMOUR THYROID PO      Take 0.5 g by mouth        ESTRADIOL PO      Take 1 mg by mouth        LISINOPRIL PO       Take 10 mg by mouth daily        magnesium 250 MG tablet      Take 1 tablet by mouth daily        OMEGA-3 FISH OIL PO      Take 2,000 mg by mouth daily        PROBIOTIC DAILY Caps           PROGESTERONE MICRONIZED PO      Take 200 mg by mouth daily        VITAMIN D3 PO      Take 5,000 Units by mouth daily

## 2018-01-19 NOTE — LETTER
[facility]  [address]  [address]  [place] [address]  [phone]    2018    Re: Nydia Phipps   :   1955  MRN:  [id number]   REFERRING PHYSICIAN:   Ariel Scott    [facility]    Date of Initial Evaluation:  18  Visits:     Reason for Referral:     Acute left ankle pain  Aftercare following surgery of the musculoskeletal system  Gait abnormality  Pain in joint involving ankle and foot, right    EVALUATION SUMMARY    [facility] Initial Evaluation  Subjective:  Patient is a 62 year old female presenting with rehab right ankle/foot hpi. The history is provided by the patient. No  was used.   Nydia Phipps is a 62 year old female with a right foot condition.  Condition occurred with:  Insidious onset.  Condition occurred: for unknown reasons.  This is a new condition  Patient is s/p right ankle ligament reconstruction on 18. She reports hardware removal from previous surgery as well. .    Patient reports pain:  Lateral.  Radiates to:  No radiation.  Pain is described as aching and is constant and reported as 3/10 (up to 7/10 with activity).  Associated symptoms:  Loss of motion/stiffness and loss of strength. Pain is worse in the P.M..  Symptoms are exacerbated by weight bearing and walking and relieved by rest.  Since onset symptoms are gradually improving.        General health as reported by patient is good.  Pertinent medical history includes:  High blood pressure and depression.  Medical allergies: no.  Other surgeries include:  Orthopedic surgery (right foot, left foot).  Current medications:  Hormone replacement therapy, thyroid medication, high blood pressure medication and anti-depressants.  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  Primary job tasks include:  Prolonged sitting and other (computer work).  Barriers include:  None as reported by patient. Red flags:  None as reported by patient.                        Objective:  Gait:    Gait Type:  Antalgic   Assistive Devices:  Brace  Deviations:  Ankle:  Push off decr RGeneral Deviations:  Stance time decr  Flexibility/Screens:     Nydia Phipps   :   1955      Lower Extremity:  Decreased left lower extremity flexibility:Gastroc  Decreased right lower extremity flexibility:  Gastroc    Ankle/Foot Evaluation  ROM:    AROM:    Dorsiflexion:  Left:   8  Right:   5  Plantarflexion:  Left:  50    Right:  35  Inversion:  Left:  35     Right:  28  Eversion:  5     Right:  0    Strength:    Dorsiflexion:  Left: 5/5      Pain:-     Plantarflexion: Left: 4+/5    Pain:-     Inversion:Left: 5-/5   Pain:-       Eversion:Left: 4+/5   Pain:-      Strength wnl ankle: Right ankle strength testing deferred.    PALPATION: normal    Assessment/Plan:    Patient is a 62 year old female with right side ankle complaints.    Patient has the following significant findings with corresponding treatment plan.                Diagnosis 1:  S/p right ankle ligament reconstruction    Pain -  self management, education, directional preference exercise and home program  Decreased ROM/flexibility - manual therapy, therapeutic exercise and home program  Decreased strength - therapeutic exercise, therapeutic activities and home program  Impaired balance - neuro re-education, therapeutic activities and home program  Impaired gait - gait training  Impaired muscle performance - neuro re-education and home program  Decreased function - therapeutic activities and home program  Previous and current functional limitations:  (See Goal Flow Sheet for this information)    Short term and Long term goals: (See Goal Flow Sheet for this information)     Communication ability:  Patient appears to be able to clearly communicate and understand verbal and written communication and follow directions  correctly.  Treatment Explanation - The following has been discussed with the patient:   RX ordered/plan of care  Anticipated outcomes  Possible risks and side effects  This patient would benefit from PT intervention to resume normal activities.   Rehab potential is good.    Frequency:  2 X week, once daily  Duration:  for 6 weeks  Discharge Plan:  Achieve all LTG.  Independent in home treatment program.  Reach maximal therapeutic benefit.  Nydia Phipps   :   1955              Thank you for your referral.    INQUIRIES  Therapist: Lokesh Walter DPT  [facility]  [address]  [place] [address]  Phone: [phone]  Fax: [phone]

## 2018-01-26 ENCOUNTER — THERAPY VISIT (OUTPATIENT)
Dept: PHYSICAL THERAPY | Facility: CLINIC | Age: 63
End: 2018-01-26
Payer: COMMERCIAL

## 2018-01-26 DIAGNOSIS — R26.9 GAIT ABNORMALITY: ICD-10-CM

## 2018-01-26 DIAGNOSIS — M25.571 PAIN IN JOINT INVOLVING ANKLE AND FOOT, RIGHT: ICD-10-CM

## 2018-01-26 DIAGNOSIS — Z47.89 AFTERCARE FOLLOWING SURGERY OF THE MUSCULOSKELETAL SYSTEM: ICD-10-CM

## 2018-01-26 PROCEDURE — 97110 THERAPEUTIC EXERCISES: CPT | Mod: GP | Performed by: PHYSICAL THERAPIST

## 2018-01-26 PROCEDURE — 97112 NEUROMUSCULAR REEDUCATION: CPT | Mod: GP | Performed by: PHYSICAL THERAPIST

## 2018-01-30 ENCOUNTER — THERAPY VISIT (OUTPATIENT)
Dept: PHYSICAL THERAPY | Facility: CLINIC | Age: 63
End: 2018-01-30
Payer: COMMERCIAL

## 2018-01-30 DIAGNOSIS — R26.9 GAIT ABNORMALITY: ICD-10-CM

## 2018-01-30 DIAGNOSIS — M25.571 PAIN IN JOINT INVOLVING ANKLE AND FOOT, RIGHT: ICD-10-CM

## 2018-01-30 DIAGNOSIS — Z47.89 AFTERCARE FOLLOWING SURGERY OF THE MUSCULOSKELETAL SYSTEM: ICD-10-CM

## 2018-01-30 PROCEDURE — 97110 THERAPEUTIC EXERCISES: CPT | Mod: GP | Performed by: PHYSICAL THERAPIST

## 2018-01-30 PROCEDURE — 97112 NEUROMUSCULAR REEDUCATION: CPT | Mod: GP | Performed by: PHYSICAL THERAPIST

## 2018-02-06 ENCOUNTER — THERAPY VISIT (OUTPATIENT)
Dept: PHYSICAL THERAPY | Facility: CLINIC | Age: 63
End: 2018-02-06
Payer: COMMERCIAL

## 2018-02-06 DIAGNOSIS — Z47.89 AFTERCARE FOLLOWING SURGERY OF THE MUSCULOSKELETAL SYSTEM: ICD-10-CM

## 2018-02-06 DIAGNOSIS — M25.571 PAIN IN JOINT INVOLVING ANKLE AND FOOT, RIGHT: ICD-10-CM

## 2018-02-06 DIAGNOSIS — R26.9 GAIT ABNORMALITY: ICD-10-CM

## 2018-02-06 PROCEDURE — 97110 THERAPEUTIC EXERCISES: CPT | Mod: GP | Performed by: PHYSICAL THERAPIST

## 2018-02-06 PROCEDURE — 97112 NEUROMUSCULAR REEDUCATION: CPT | Mod: GP | Performed by: PHYSICAL THERAPIST

## 2018-02-16 ENCOUNTER — THERAPY VISIT (OUTPATIENT)
Dept: PHYSICAL THERAPY | Facility: CLINIC | Age: 63
End: 2018-02-16
Payer: COMMERCIAL

## 2018-02-16 DIAGNOSIS — Z47.89 AFTERCARE FOLLOWING SURGERY OF THE MUSCULOSKELETAL SYSTEM: ICD-10-CM

## 2018-02-16 DIAGNOSIS — M25.571 PAIN IN JOINT INVOLVING ANKLE AND FOOT, RIGHT: ICD-10-CM

## 2018-02-16 DIAGNOSIS — R26.9 GAIT ABNORMALITY: ICD-10-CM

## 2018-02-16 PROCEDURE — 97110 THERAPEUTIC EXERCISES: CPT | Mod: GP | Performed by: PHYSICAL THERAPIST

## 2018-02-16 PROCEDURE — 97112 NEUROMUSCULAR REEDUCATION: CPT | Mod: GP | Performed by: PHYSICAL THERAPIST

## 2018-04-10 ENCOUNTER — THERAPY VISIT (OUTPATIENT)
Dept: PHYSICAL THERAPY | Facility: CLINIC | Age: 63
End: 2018-04-10
Payer: COMMERCIAL

## 2018-04-10 DIAGNOSIS — M25.571 PAIN IN JOINT INVOLVING ANKLE AND FOOT, RIGHT: ICD-10-CM

## 2018-04-10 DIAGNOSIS — R26.9 GAIT ABNORMALITY: ICD-10-CM

## 2018-04-10 DIAGNOSIS — Z47.89 AFTERCARE FOLLOWING SURGERY OF THE MUSCULOSKELETAL SYSTEM: ICD-10-CM

## 2018-04-10 PROCEDURE — 97110 THERAPEUTIC EXERCISES: CPT | Mod: GP | Performed by: PHYSICAL THERAPIST

## 2018-04-10 PROCEDURE — 97112 NEUROMUSCULAR REEDUCATION: CPT | Mod: GP | Performed by: PHYSICAL THERAPIST

## 2018-04-10 NOTE — MR AVS SNAPSHOT
"              After Visit Summary   4/10/2018    Nydia Phipps    MRN: [id number]           Patient Information     Date Of Birth          1955        Visit Information        Provider Department      4/10/2018 8:00 AM Lokesh Walter PT [facility] [place] [facility]        Today's Diagnoses     Pain in joint involving ankle and foot, right        Aftercare following surgery of the musculoskeletal system        Gait abnormality           Follow-ups after your visit        Who to contact     If you have questions or need follow up information about today's clinic visit or your schedule please contact [facility] directly at [phone].  Normal or non-critical lab and imaging results will be communicated to you by [employer], letter or phone within 4 business days after the clinic has received the results. If you do not hear from us within 7 days, please contact the clinic through [employer] or phone. If you have a critical or abnormal lab result, we will notify you by phone as soon as possible.  Submit refill requests through [employer] or call your pharmacy and they will forward the refill request to us. Please allow 3 business days for your refill to be completed.          Additional Information About Your Visit        MyChart Information     [employer] lets you send messages to your doctor, view your test results, renew your prescriptions, schedule appointments and more. To sign up, go to [email or web address] . Click on \"Log in\" on the left side of the screen, which will take you to the Welcome page. Then click on \"Sign up Now\" on the right side of the page.     You will be asked to enter the access code listed below, as well as some personal information. Please follow the directions to create your username and password.     Your access code is: [email or web address]  Expires: 2018  2:25 PM     Your access code will  in 90 days. If you need help or a new code, please call " your [place] clinic or [phone].        Care EveryWhere ID     This is your Care EveryWhere ID. This could be used by other organizations to access your [facility] medical records  [id number]         Blood Pressure from Last 3 Encounters:   12/02/15 137/87   10/10/15 129/77    Weight from Last 3 Encounters:   12/02/15 77.1 kg (170 lb)   10/10/15 75.6 kg (166 lb 9.6 oz)              We Performed the Following     [facility] PROGRESS NOTES REPORT     NEUROMUSCULAR RE-EDUCATION     THERAPEUTIC EXERCISES        Primary Care Provider Office Phone # Fax #    Tim Zhang [phone]       [address] [address]        Equal Access to Services     TO CRESPO : Hadii aad ku hadasho Soadolfoali, waaxda luqadaha, qaybta kaalmada adeegyada, silva valdivia . So [facility] [phone].    ATENCIÓN: Si habla español, tiene a garcía disposición servicios gratuitos de asistencia lingüística. [facility] [phone].    We comply with applicable federal civil rights laws and [place] laws. We do not discriminate on the basis of race, color, national origin, age, disability, sex, sexual orientation, or gender identity.            Thank you!     Thank you for choosing [facility]  for your care. Our goal is always to provide you with excellent care. Hearing back from our patients is one way we can continue to improve our services. Please take a few minutes to complete the written survey that you may receive in the mail after your visit with us. Thank you!             Your Updated Medication List - Protect others around you: Learn how to safely use, store and throw away your medicines at www.disposemymeds.org.          This list is accurate as of 4/10/18  8:39 AM.  Always use your most recent med list.                   Brand Name Dispense Instructions for use Diagnosis    AMBIEN PO      Take 10 mg by mouth At Bedtime        AMITRIPTYLINE HCL PO       Take 10 mg by mouth At Bedtime        ARMOUR THYROID PO      Take 0.5 g by mouth        ESTRADIOL PO      Take 1 mg by mouth        LISINOPRIL PO      Take 10 mg by mouth daily        magnesium 250 MG tablet      Take 1 tablet by mouth daily        OMEGA-3 FISH OIL PO      Take 2,000 mg by mouth daily        PROBIOTIC DAILY Caps           PROGESTERONE MICRONIZED PO      Take 200 mg by mouth daily        VITAMIN D3 PO      Take 5,000 Units by mouth daily

## 2018-04-10 NOTE — PROGRESS NOTES
Subjective:  HPI                    Objective:  System    Physical Exam    General     ROS    Assessment/Plan:    DISCHARGE REPORT    Progress reporting period is from 1/19/18 to 4/10/18.       SUBJECTIVE  Subjective changes noted by patient: Patient returns to therapy after 2 months away. She reports that she hasn't been doing the calf stretches or the balance because they bother her knee. She reports that the strenthening exercises are going fine.    Current Pain level: 0/10.      Initial Pain level: 3/10.   Changes in function:  Yes (See Goal flowsheet attached for changes in current functional level)  Adverse reaction to treatment or activity: None    OBJECTIVE  Changes noted in objective findings:  AROM right ankle DF 10, PF 45, Inversion 35, Eversion 0. AAROM eversion 8 degrees. SLS 30 seconds with contralateral hip drop. Decreased flexibility remains in right gastrocsoleus     ASSESSMENT/PLAN  Updated problem list and treatment plan: Diagnosis 1:  S/p right ankle ligament reconstruction     Decreased ROM/flexibility - home program  Decreased strength - home program  Impaired balance - home program  Decreased function - home program  STG/LTGs have been met or progress has been made towards goals:  Yes (See Goal flow sheet completed today.)  Assessment of Progress: The patient's progress has slowed.  Self Management Plans:  Patient has been instructed in a home treatment program.  Patient is independent in a home treatment program.  Patient  has been instructed in self management of symptoms.  Patient is independent in self management of symptoms.  I have re-evaluated this patient and find that the nature, scope, duration and intensity of the therapy is appropriate for the medical condition of the patient.  Nydia continues to require the following intervention to meet STG and LTG's:  PT intervention is no longer required to meet STG/LTG.    Recommendations:  This patient is ready to be discharged from therapy  and continue their home treatment program.    Please refer to the daily flowsheet for treatment today, total treatment time and time spent performing 1:1 timed codes.

## 2018-04-10 NOTE — LETTER
[facility]  [address]  [address]  [place] [address]  [phone]    April 10, 2018    Re: Nydia Phipps   :   1955  MRN:  [id number]   REFERRING PHYSICIAN:   Ariel Scott    [facility]    Date of Initial Evaluation: 18  Visits:     Reason for Referral:     Pain in joint involving ankle and foot, right  Aftercare following surgery of the musculoskeletal system  Gait abnormality    DISCHARGE REPORT    Progress reporting period is from 18 to 4/10/18.       SUBJECTIVE  Subjective changes noted by patient: Patient returns to therapy after 2 months away. She reports that she hasn't been doing the calf stretches or the balance because they bother her knee. She reports that the strenthening exercises are going fine.    Current Pain level: 0/10.      Initial Pain level: 3/10.   Changes in function:  Yes (See Goal flowsheet attached for changes in current functional level)  Adverse reaction to treatment or activity: None    OBJECTIVE  Changes noted in objective findings:  AROM right ankle DF 10, PF 45, Inversion 35, Eversion 0. AAROM eversion 8 degrees. SLS 30 seconds with contralateral hip drop. Decreased flexibility remains in right gastrocsoleus     ASSESSMENT/PLAN  Updated problem list and treatment plan: Diagnosis 1:  S/p right ankle ligament reconstruction     Decreased ROM/flexibility - home program  Decreased strength - home program  Impaired balance - home program  Decreased function - home program  STG/LTGs have been met or progress has been made towards goals:  Yes (See Goal flow sheet completed today.)  Assessment of Progress: The patient's progress has slowed.  Self Management Plans:  Patient has been instructed in a home treatment program.  Patient is independent in a home treatment program.  Patient  has been instructed in self management of symptoms.  Patient is independent in self management of  symptoms.  I have re-evaluated this patient and find that the nature, scope, duration and intensity of the therapy is appropriate for the medical condition of the patient.  Nydia continues to require the following intervention to meet STG and LTG's:  PT intervention is no longer required to meet STG/LTG.    Recommendations:  This patient is ready to be discharged from therapy and continue their home treatment program.          Nydia CALLES Phipps   :   1955                Thank you for your referral.    INQUIRIES  Therapist: Lokesh Walter PT  [facility]  [address]  [place] [address]  Phone: [phone]  Fax: [phone]

## 2021-06-08 ENCOUNTER — ANCILLARY PROCEDURE (OUTPATIENT)
Dept: GENERAL RADIOLOGY | Facility: CLINIC | Age: 66
End: 2021-06-08
Attending: FAMILY MEDICINE
Payer: COMMERCIAL

## 2021-06-08 ENCOUNTER — OFFICE VISIT (OUTPATIENT)
Dept: ORTHOPEDICS | Facility: CLINIC | Age: 66
End: 2021-06-08
Payer: COMMERCIAL

## 2021-06-08 VITALS — DIASTOLIC BLOOD PRESSURE: 82 MMHG | BODY MASS INDEX: 26.63 KG/M2 | HEIGHT: 67 IN | SYSTOLIC BLOOD PRESSURE: 112 MMHG

## 2021-06-08 DIAGNOSIS — M54.42 ACUTE MIDLINE LOW BACK PAIN WITH LEFT-SIDED SCIATICA: ICD-10-CM

## 2021-06-08 DIAGNOSIS — M54.42 ACUTE MIDLINE LOW BACK PAIN WITH LEFT-SIDED SCIATICA: Primary | ICD-10-CM

## 2021-06-08 PROCEDURE — 99204 OFFICE O/P NEW MOD 45 MIN: CPT | Performed by: FAMILY MEDICINE

## 2021-06-08 PROCEDURE — 72100 X-RAY EXAM L-S SPINE 2/3 VWS: CPT | Performed by: RADIOLOGY

## 2021-06-08 RX ORDER — CYCLOBENZAPRINE HCL 5 MG
5 TABLET ORAL
Qty: 15 TABLET | Refills: 0 | Status: SHIPPED | OUTPATIENT
Start: 2021-06-08

## 2021-06-08 RX ORDER — METHYLPREDNISOLONE 4 MG
TABLET, DOSE PACK ORAL
Qty: 21 TABLET | Refills: 0 | Status: SHIPPED | OUTPATIENT
Start: 2021-06-08

## 2021-06-08 NOTE — PATIENT INSTRUCTIONS
# Left Lumbar Radiculopathy: Notable after gardening/lifitng on 6/2/21.  Pain over the midline in the thoracolumbar junction with pain radiating down her left leg.  She does have limited range of motion in flexion due to pain.  Reviewed x-rays today showing mild facet arthropathy with no compression fracture.   concerned symptoms may be due to acute lumbar radiculopathy.  Counseled patient on nature of condition and treatment options.  Given this plan as below, follow-up as needed.  Image Findings: no compression fracture, mild facet arthritis   Treatment: Home exercises, encourage activities as tolerated  Medications/Injections: Medrol dose pack, Flexeril  Follow-up: In one month if symptoms do not improve, sooner if worsening    Please call [phone]   Ask for my team if you have any questions or concerns     It was great seeing you today!    Kamlesh Echols MD, CAQSM    Yoga Strap  3 sets of 30 sec on each side twice daily        Patient Education     Understanding Lumbar Radiculopathy    Lumbar radiculopathy is irritation or inflammation of a nerve root in the low back. It causes symptoms that spread out from the back down one or both legs. To understand this condition, it helps to understand the parts of the spine:    Vertebrae. These are bones that stack to form the spine. The lumbar spine contains 5 vertebrae near the bottom of your spine.    Disks. These are soft pads of tissue between the vertebrae. They act as shock absorbers for the spine.    Spinal canal. This is a tunnel formed within the stacked vertebrae. In the lumbar spine, nerves run through this canal.    Nerves. These branch off and leave the spinal canal, traveling out to parts of the body. As they leave the spinal canal, nerves pass through openings between the vertebrae. The nerve root is the part of the nerve that is closest to the spinal canal.    Sciatic nerve. This is a large nerve formed from several nerve roots in the low back.  This nerve extends down the back of the leg to the foot.  With lumbar radiculopathy, nerve roots in the low back become irritated. This leads to pain and symptoms. The sciatic nerve is commonly involved, so the condition is often called sciatica.  What causes lumbar radiculopathy?  Aging, injury, poor posture, extra body weight, and other issues can lead to problems in the low back. These problems may then irritate nerve roots. They include:    Damage to a disk in the lumbar spine. The damaged disk may then press on nearby nerve roots.    Degeneration from wear and tear, and aging. This can lead to narrowing (stenosis) of the openings between the vertebrae. The narrowed openings press on nerve roots as they leave the spinal canal.    Unstable spine. This is when a vertebra slips forward. It can then press on a nerve root.  Other, less common things can put pressure on nerves in the low back. These include diabetes, infection, or a tumor.  Symptoms of lumbar radiculopathy  These include:    Pain in the low back    Pain, numbness, tingling, or weakness that travels into the buttocks, hip, groin, or leg    Muscle spasms in the low back, or leg  Treatment for lumbar radiculopathy  In most cases, your healthcare provider will first try treatments that help relieve symptoms. These may include:    Prescription and over-the-counter pain medicines. These help relieve pain, swelling, and irritation.    Limits on positions and activities that increase pain. But lying in bed or avoiding all movement is only recommended for a short period of time.    Physical therapy, including exercises and stretches. This helps decrease pain and increase movement and function.    Steroid shots into the lower back. This may help relieve symptoms for a time.    Weight-loss program. If you are overweight, losing extra pounds (kilograms) may help relieve symptoms.  In some cases, you may need surgery to fix the underlying problem. This depends on  the cause, the symptoms, and how long the pain has lasted.  Possible complications  Over time, an irritated and inflamed nerve may become damaged. This may lead to long-lasting (permanent) numbness or weakness in your legs and feet. If symptoms change suddenly or get worse, be sure to let your healthcare provider know.  When to call your healthcare provider  Call your healthcare provider right away if you have any of these:    New pain or pain that gets worse    New or increasing weakness, tingling, or numbness in your leg or foot    Problems controlling your bladder or bowel  [place] last reviewed this educational content on 2/1/2020 2000-2021 [employer]. All rights reserved. This information is not intended as a substitute for professional medical care. Always follow your healthcare professional's instructions.

## 2021-06-08 NOTE — LETTER
6/8/2021         RE: Nydia Phipps  [address]  [place] [address]        Dear Colleague,    Thank you for referring your patient, Nydia Phipps, to the [facility]. Please see a copy of my visit note below.    ASSESSMENT & PLAN    Diagnoses and all orders for this visit:    Acute midline low back pain with left-sided sciatica  -     XR Lumbar Spine 2-3 Views*; Future  -     cyclobenzaprine (FLEXERIL) 5 MG tablet; Take 1 tablet (5 mg) by mouth nightly as needed for muscle spasms  -     methylPREDNISolone (MEDROL DOSEPAK) 4 MG tablet therapy pack; Follow Package Directions      This issue is acute and Worsening.    # Left Lumbar Radiculopathy: Notable after gardening/lifitng on 6/2/21.  Pain over the midline in the thoracolumbar junction with pain radiating down her left leg.  She does have limited range of motion in flexion due to pain.  Reviewed x-rays today showing mild facet arthropathy with no compression fracture.   Concerned symptoms may be due to acute lumbar radiculopathy.  Counseled patient on nature of condition and treatment options.  Given this plan as below, follow-up as needed.  Image Findings: no compression fracture, mild facet arthritis   Treatment: Home exercises, encourage activities as tolerated  Medications/Injections: Medrol dose pack, Flexeril  Follow-up: In one month if symptoms do not improve, sooner if worsening    Kamlesh Echols MD  [facility]    -----  No chief complaint on file.      SUBJECTIVE  Nydia Phipps is a/an 65 year old female who is seen as a self referral, [facility] for evaluation of low back pain.     The patient is seen by themselves.     Onset: 6/2/21, 6 day(s) ago. Patient describes injury as lifting bag of potting soil.  Pain started the following day.  Pt has HO osteopenia concerned about compression fracture.  No HO back pain.  Location of Pain: left sided low back pain  "radiating to buttock   Worsened by: sit to stand transition, forward flexion   Better with: nothing   Treatments tried: ibuprofen and massage  Associated symptoms: no distal numbness or tingling; denies swelling or warmth    Orthopedic/Surgical history: Osetopenia  Social History/Occupation: Retired, cabin, garden     No family history pertinent to patient's problem today. Sister has osteoporosis and RA    REVIEW OF SYSTEMS:  Review of Systems  Constitutional, HEENT, cardiovascular, pulmonary, gi and gu systems are negative, except as otherwise noted.    OBJECTIVE:  /82   Ht 1.702 m (5' 7\")   BMI 26.63 kg/m     General: healthy, alert and in no distress  HEENT: no scleral icterus or conjunctival erythema  Skin: no suspicious lesions or rash. No jaundice.  CV: distal perfusion intact   Resp: normal respiratory effort without conversational dyspnea   Psych: normal mood and affect  Gait: normal steady gait with appropriate coordination and balance    Neuro: Normal light sensory exam of bilateral lower extremities    THORACIC/LUMBAR SPINE  Inspection:    No redness, swelling, overlying skin change, gross deformity/asymmetry, scapular winging  Palpation:    Tender about the left para lumbar muscles, right para lumbar muscles and left sciatic notch. Otherwise remainder of landmarks are nontender.  Range of Motion:     Lumbar flexion limited slightly by pain    Lumbar extension limited slightly by pain    Right side bend limited slightly by pain    Left side bend limited slightly by pain    Right rotation limited slightly by pain    Left rotation limited slightly by pain  Strength:    5/5 - quadriceps, hamstrings, tibialis anterior, gastrocsoleus, and extensor hallicus longus  Special Tests:    Positive: mildly pos SLR and slump on left  Negative: straight leg raise (right), slump test (right)    RADIOLOGY:  I independently  ordered, visualized and reviewed these images with the patient    LUMBAR SPINE TWO TO THREE " VIEWS 6/8/2021 10:40 AM      COMPARISON: None     HISTORY: Acute midline low back pain with left-sided sciatica.                                                                      IMPRESSION: There is normal alignment of the lumbar vertebrae.  Vertebral body heights of the lumbar spine are normal. No fractures.  No significant degenerative change.     DEVYN HARPER MD    I independently ordered, visualized and reviewed these images with the patient  Normal lumbar XR no significant disc degeneration, mild lumbar L4/5 facet arthropathy             Again, thank you for allowing me to participate in the care of your patient.        Sincerely,        Kamlesh Echols MD

## 2021-06-08 NOTE — PROGRESS NOTES
ASSESSMENT & PLAN    Diagnoses and all orders for this visit:    Acute midline low back pain with left-sided sciatica  -     XR Lumbar Spine 2-3 Views*; Future  -     cyclobenzaprine (FLEXERIL) 5 MG tablet; Take 1 tablet (5 mg) by mouth nightly as needed for muscle spasms  -     methylPREDNISolone (MEDROL DOSEPAK) 4 MG tablet therapy pack; Follow Package Directions      This issue is acute and Worsening.    # Left Lumbar Radiculopathy: Notable after gardening/lifitng on 6/2/21.  Pain over the midline in the thoracolumbar junction with pain radiating down her left leg.  She does have limited range of motion in flexion due to pain.  Reviewed x-rays today showing mild facet arthropathy with no compression fracture.   Concerned symptoms may be due to acute lumbar radiculopathy.  Counseled patient on nature of condition and treatment options.  Given this plan as below, follow-up as needed.  Image Findings: no compression fracture, mild facet arthritis   Treatment: Home exercises, encourage activities as tolerated  Medications/Injections: Medrol dose pack, Flexeril  Follow-up: In one month if symptoms do not improve, sooner if worsening    Kamlesh Echols MD  [facility]    -----  No chief complaint on file.      SUBJECTIVE  Nydia Phipps is a/an 65 year old female who is seen as a self referral, AIC for evaluation of low back pain.     The patient is seen by themselves.     Onset: 6/2/21, 6 day(s) ago. Patient describes injury as lifting bag of potting soil.  Pain started the following day.  Pt has HO osteopenia concerned about compression fracture.  No HO back pain.  Location of Pain: left sided low back pain radiating to buttock   Worsened by: sit to stand transition, forward flexion   Better with: nothing   Treatments tried: ibuprofen and massage  Associated symptoms: no distal numbness or tingling; denies swelling or warmth    Orthopedic/Surgical history: Osetopenia  Social  "History/Occupation: Retired, cabin, garden     No family history pertinent to patient's problem today. Sister has osteoporosis and RA    REVIEW OF SYSTEMS:  Review of Systems  Constitutional, HEENT, cardiovascular, pulmonary, gi and gu systems are negative, except as otherwise noted.    OBJECTIVE:  /82   Ht 1.702 m (5' 7\")   BMI 26.63 kg/m     General: healthy, alert and in no distress  HEENT: no scleral icterus or conjunctival erythema  Skin: no suspicious lesions or rash. No jaundice.  CV: distal perfusion intact   Resp: normal respiratory effort without conversational dyspnea   Psych: normal mood and affect  Gait: normal steady gait with appropriate coordination and balance    Neuro: Normal light sensory exam of bilateral lower extremities    THORACIC/LUMBAR SPINE  Inspection:    No redness, swelling, overlying skin change, gross deformity/asymmetry, scapular winging  Palpation:    Tender about the left para lumbar muscles, right para lumbar muscles and left sciatic notch. Otherwise remainder of landmarks are nontender.  Range of Motion:     Lumbar flexion limited slightly by pain    Lumbar extension limited slightly by pain    Right side bend limited slightly by pain    Left side bend limited slightly by pain    Right rotation limited slightly by pain    Left rotation limited slightly by pain  Strength:    5/5 - quadriceps, hamstrings, tibialis anterior, gastrocsoleus, and extensor hallicus longus  Special Tests:    Positive: mildly pos SLR and slump on left  Negative: straight leg raise (right), slump test (right)    RADIOLOGY:  I independently  ordered, visualized and reviewed these images with the patient    LUMBAR SPINE TWO TO THREE VIEWS 6/8/2021 10:40 AM      COMPARISON: None     HISTORY: Acute midline low back pain with left-sided sciatica.                                                                      IMPRESSION: There is normal alignment of the lumbar vertebrae.  Vertebral body heights of " the lumbar spine are normal. No fractures.  No significant degenerative change.     DEVYN HARPER MD    I independently ordered, visualized and reviewed these images with the patient  Normal lumbar XR no significant disc degeneration, mild lumbar L4/5 facet arthropathy

## 2021-07-10 ENCOUNTER — HEALTH MAINTENANCE LETTER (OUTPATIENT)
Age: 66
End: 2021-07-10

## 2021-09-04 ENCOUNTER — HEALTH MAINTENANCE LETTER (OUTPATIENT)
Age: 66
End: 2021-09-04

## 2021-09-08 ENCOUNTER — APPOINTMENT (OUTPATIENT)
Dept: URBAN - METROPOLITAN AREA CLINIC 252 | Age: 66
Setting detail: DERMATOLOGY
End: 2021-09-12

## 2021-09-08 VITALS — RESPIRATION RATE: 14 BRPM | WEIGHT: 160 LBS | HEIGHT: 55 IN

## 2021-09-08 DIAGNOSIS — L73.8 OTHER SPECIFIED FOLLICULAR DISORDERS: ICD-10-CM

## 2021-09-08 DIAGNOSIS — Z71.89 OTHER SPECIFIED COUNSELING: ICD-10-CM

## 2021-09-08 DIAGNOSIS — D22 MELANOCYTIC NEVI: ICD-10-CM

## 2021-09-08 DIAGNOSIS — L82.1 OTHER SEBORRHEIC KERATOSIS: ICD-10-CM

## 2021-09-08 PROBLEM — D48.5 NEOPLASM OF UNCERTAIN BEHAVIOR OF SKIN: Status: ACTIVE | Noted: 2021-09-08

## 2021-09-08 PROBLEM — D22.39 MELANOCYTIC NEVI OF OTHER PARTS OF FACE: Status: ACTIVE | Noted: 2021-09-08

## 2021-09-08 PROCEDURE — OTHER PATHOLOGY BILLING: OTHER

## 2021-09-08 PROCEDURE — OTHER BIOPSY BY SHAVE METHOD: OTHER

## 2021-09-08 PROCEDURE — 88305 TISSUE EXAM BY PATHOLOGIST: CPT

## 2021-09-08 PROCEDURE — 99203 OFFICE O/P NEW LOW 30 MIN: CPT | Mod: 25

## 2021-09-08 PROCEDURE — 11102 TANGNTL BX SKIN SINGLE LES: CPT

## 2021-09-08 PROCEDURE — OTHER COUNSELING: OTHER

## 2021-09-08 ASSESSMENT — LOCATION DETAILED DESCRIPTION DERM
LOCATION DETAILED: RIGHT SUPERIOR MEDIAL UPPER BACK
LOCATION DETAILED: INFERIOR THORACIC SPINE
LOCATION DETAILED: UPPER STERNUM
LOCATION DETAILED: LEFT NASAL ALAR GROOVE
LOCATION DETAILED: LEFT INFERIOR LATERAL MALAR CHEEK

## 2021-09-08 ASSESSMENT — LOCATION SIMPLE DESCRIPTION DERM
LOCATION SIMPLE: CHEST
LOCATION SIMPLE: LEFT CHEEK
LOCATION SIMPLE: LEFT NOSE
LOCATION SIMPLE: RIGHT UPPER BACK
LOCATION SIMPLE: UPPER BACK

## 2021-09-08 ASSESSMENT — LOCATION ZONE DERM
LOCATION ZONE: TRUNK
LOCATION ZONE: FACE
LOCATION ZONE: NOSE

## 2021-09-08 NOTE — PROCEDURE: PATHOLOGY BILLING
Rendering Text In Billing: The slides will be read by [facility] and reported in the attached document. Rendering Text In Billing: The slides will be read by [employer] and reported in the attached document.

## 2021-09-08 NOTE — PROCEDURE: COUNSELING
Detail Level: Detailed
Patient Specific Counseling (Will Not Stick From Patient To Patient): Discussed cosmetic removal and pricing for benign lesions. Pt deferred pursuing cosmetic treatment today.
Detail Level: Generalized
Patient Specific Counseling (Will Not Stick From Patient To Patient): Recommend Nicotinamide 500 bid for NMsc
Patient Specific Counseling (Will Not Stick From Patient To Patient): - Advised that this lesion has features that suggest a skin cancer so a biopsy is necessary to confirm the diagnosis and guide treatment.\\n- Patient expressed understanding. \\n- If skin cancer is confirmed, further treatment will be necessary.
Detail Level: Simple
Detail Level: Zone

## 2021-09-08 NOTE — PROCEDURE: BIOPSY BY SHAVE METHOD
Hide Anticipated Plan (Based On Presumed Biopsy Results)?: Yes
Size Of Lesion In Cm: 0
Bill For Surgical Tray: no
Dressing: bandage
Type Of Destruction Used: Electrodesiccation
Electrodesiccation And Curettage Text: The wound bed was treated with electrodesiccation and curettage after the biopsy was performed.
Cryotherapy Text: The wound bed was treated with cryotherapy after the biopsy was performed.
Consent: - Verbal and written consent was obtained and risks were reviewed prior to procedure today. \\n- Risks discussed include but are not limited to scarring, infection, bleeding, scabbing, incomplete removal, nerve damage, pain, and allergy to anesthesia.
Notification Instructions: - It can take up to 2 weeks to get a biopsy result. \\n- Please refrain from calling to request results until after 2 weeks.
Biopsy Method: Dermablade
Curettage Text: The wound bed was treated with curettage after the biopsy was performed.
Biopsy Type: H and E
Wound Care: Petrolatum
Silver Nitrate Text: The wound bed was treated with silver nitrate after the biopsy was performed.
Depth Of Biopsy: dermis
Anesthesia Type: 2% lidocaine with epinephrine
Post-Care Instructions: WOUND CARE:\\nDo NOT submerge wound in a bath, swimming pool, or hot tub for at least 1 week or for as long as there is an open wound. Gently cleanse the site daily with mild soap and water. Be careful NOT to allow a forceful stream of water to hit the biopsy site. After cleaning/showering, apply a thin layer of petrolatum ointment or Aquaphor in the wound followed by an adhesive bandage. Continue this process daily until healed. \\n\\nBLEEDING:\\nIf you develop persistent bleeding, apply firm and steady pressure over the dressing with gauze for 15 minutes. If bleeding persists, reapply pressure with an ice pack over the gauze for 15 minutes. NEVER APPLY ICE DIRECTLY TO THE SKIN. Do NOT peek under the gauze during these 15 minutes of pressure.  Call our office at [phone] if you cannot get the bleeding to stop. \\n\\nINFECTION:\\nSigns of infection may include increasing rather than decreasing pain (after the first few days), increasing redness/swelling/heat, draining pus, pink/red streaks around the wound, and fever or chills.  Please call our office immediately at [phone] if infection is suspected. It is normal to have some mild redness on or around the wound edges; this will lighten day by day and will become less tender.\\n\\nPAIN:\\nPain is usually minimal, but if needed you may take acetaminophen (Tylenol) every four hours as needed. Applying an ice pack over the dressing for 15-20 minutes every 2-3 hours will relieve swelling, lessen pain, and help minimize bruising. NEVER APPLY ICE DIRECTLY TO THE SKIN. Avoid ibuprofen (Advil, Motrin) and naproxen (Aleve) for the first 48 hours as these can increase bleeding.\\n\\nSWELLIG AND BRUISING:\\nSwelling and bruising are common and temporary, usually lasting 1 - 2 weeks. It is more common in areas treated around the eyes, hands, and feet. In the days following the procedure, swelling and bruising can be minimized by keeping the affected areas elevated when possible, reducing salty foods, and applying ice packs over the dressing for 15-20 minutes every 2-3 hours. NEVER APPLY ICE DIRECTLY TO THE SKIN.\\n\\nITCHING:\\nItchiness on and around the wound is very common and can last several days to weeks after surgery. Mild itch is normal as the wound is healing. \\n\\nNERVE CHANGES:\\nNumbness is usually temporary, but it may last for several weeks to months. You may also experience sharp pains at the wound sight as it heals.  Mild pain is normal and will gradually improve with time.\\n \\nNO SMOKING:\\nSmoking will delay the healing process. If you smoke, we recommend trying to quit or at minimum reduce how much you smoke following a procedure.
Electrodesiccation Text: The wound bed was treated with electrodesiccation after the biopsy was performed.
Information: Selecting Yes will display possible errors in your note based on the variables you have selected. This validation is only offered as a suggestion for you. PLEASE NOTE THAT THE VALIDATION TEXT WILL BE REMOVED WHEN YOU FINALIZE YOUR NOTE. IF YOU WANT TO FAX A PRELIMINARY NOTE YOU WILL NEED TO TOGGLE THIS TO 'NO' IF YOU DO NOT WANT IT IN YOUR FAXED NOTE.
Billing Type: Client Bill
Hemostasis: Aluminum Chloride
Detail Level: Detailed
Anesthesia Volume In Cc (Will Not Render If 0): 0.5

## 2021-09-08 NOTE — PROCEDURE: PATHOLOGY BILLING
Immunohistochemistry ([id number] and [id number]) billing is not performed here. Please use the Immunohistochemistry Stain Billing plan to accomplish this. Immunohistochemistry ([id number] and [id number]) billing is not performed here. Please use the Immunohistochemistry Stain Billing plan to accomplish this.

## 2022-08-06 ENCOUNTER — HEALTH MAINTENANCE LETTER (OUTPATIENT)
Age: 67
End: 2022-08-06

## 2022-10-22 ENCOUNTER — HEALTH MAINTENANCE LETTER (OUTPATIENT)
Age: 67
End: 2022-10-22

## 2023-08-27 ENCOUNTER — HEALTH MAINTENANCE LETTER (OUTPATIENT)
Age: 68
End: 2023-08-27

## 2023-11-05 ENCOUNTER — HEALTH MAINTENANCE LETTER (OUTPATIENT)
Age: 68
End: 2023-11-05